# Patient Record
Sex: FEMALE | Race: WHITE | HISPANIC OR LATINO | Employment: OTHER | URBAN - METROPOLITAN AREA
[De-identification: names, ages, dates, MRNs, and addresses within clinical notes are randomized per-mention and may not be internally consistent; named-entity substitution may affect disease eponyms.]

---

## 2021-09-09 ENCOUNTER — LAB (OUTPATIENT)
Dept: LAB | Facility: CLINIC | Age: 21
End: 2021-09-09
Payer: COMMERCIAL

## 2021-09-09 DIAGNOSIS — H20.9 UVEITIS: Primary | ICD-10-CM

## 2021-09-09 LAB
BASOPHILS # BLD AUTO: 0 10E3/UL (ref 0–0.2)
BASOPHILS NFR BLD AUTO: 0 %
EOSINOPHIL # BLD AUTO: 0.1 10E3/UL (ref 0–0.7)
EOSINOPHIL NFR BLD AUTO: 2 %
ERYTHROCYTE [DISTWIDTH] IN BLOOD BY AUTOMATED COUNT: 12 % (ref 10–15)
HCT VFR BLD AUTO: 38.4 % (ref 35–47)
HGB BLD-MCNC: 12.7 G/DL (ref 11.7–15.7)
IMM GRANULOCYTES # BLD: 0 10E3/UL
IMM GRANULOCYTES NFR BLD: 0 %
LYMPHOCYTES # BLD AUTO: 2.4 10E3/UL (ref 0.8–5.3)
LYMPHOCYTES NFR BLD AUTO: 28 %
MCH RBC QN AUTO: 29.3 PG (ref 26.5–33)
MCHC RBC AUTO-ENTMCNC: 33.1 G/DL (ref 31.5–36.5)
MCV RBC AUTO: 89 FL (ref 78–100)
MONOCYTES # BLD AUTO: 0.4 10E3/UL (ref 0–1.3)
MONOCYTES NFR BLD AUTO: 4 %
NEUTROPHILS # BLD AUTO: 5.5 10E3/UL (ref 1.6–8.3)
NEUTROPHILS NFR BLD AUTO: 66 %
NRBC # BLD AUTO: 0 10E3/UL
NRBC BLD AUTO-RTO: 0 /100
PLATELET # BLD AUTO: 304 10E3/UL (ref 150–450)
RBC # BLD AUTO: 4.34 10E6/UL (ref 3.8–5.2)
RHEUMATOID FACT SER NEPH-ACNC: <15 IU/ML (ref 0–30)
WBC # BLD AUTO: 8.5 10E3/UL (ref 4–11)

## 2021-09-09 PROCEDURE — 81374 HLA I TYPING 1 ANTIGEN LR: CPT

## 2021-09-09 PROCEDURE — 86038 ANTINUCLEAR ANTIBODIES: CPT

## 2021-09-09 PROCEDURE — 86780 TREPONEMA PALLIDUM: CPT

## 2021-09-09 PROCEDURE — 85025 COMPLETE CBC W/AUTO DIFF WBC: CPT

## 2021-09-09 PROCEDURE — 36415 COLL VENOUS BLD VENIPUNCTURE: CPT

## 2021-09-09 PROCEDURE — 82164 ANGIOTENSIN I ENZYME TEST: CPT

## 2021-09-09 PROCEDURE — 86431 RHEUMATOID FACTOR QUANT: CPT

## 2021-09-09 PROCEDURE — 86618 LYME DISEASE ANTIBODY: CPT

## 2021-09-10 LAB
B BURGDOR IGG+IGM SER QL: 0.84
T PALLIDUM AB SER QL: NEGATIVE

## 2021-09-11 LAB — ACE SERPL-CCNC: 35 U/L

## 2021-09-14 LAB
ANA PAT SER IF-IMP: ABNORMAL
ANA SER QL IF: ABNORMAL
ANA TITR SER IF: ABNORMAL {TITER}

## 2021-09-16 LAB
B LOCUS: NORMAL
B27TEST METHOD: NORMAL

## 2022-06-19 ENCOUNTER — HOSPITAL ENCOUNTER (EMERGENCY)
Facility: CLINIC | Age: 22
Discharge: HOME | End: 2022-06-20
Attending: EMERGENCY MEDICINE | Admitting: EMERGENCY MEDICINE
Payer: COMMERCIAL

## 2022-06-19 ENCOUNTER — HOSPITAL ENCOUNTER (EMERGENCY)
Facility: CLINIC | Age: 22
Discharge: ANOTHER HOSPITAL | End: 2022-06-19
Attending: INTERNAL MEDICINE | Admitting: INTERNAL MEDICINE
Payer: COMMERCIAL

## 2022-06-19 VITALS
DIASTOLIC BLOOD PRESSURE: 94 MMHG | HEART RATE: 72 BPM | OXYGEN SATURATION: 91 % | SYSTOLIC BLOOD PRESSURE: 120 MMHG | RESPIRATION RATE: 16 BRPM | TEMPERATURE: 98.5 F | WEIGHT: 129.85 LBS
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
## 2022-06-19 DIAGNOSIS — S09.93XA FACIAL INJURY, INITIAL ENCOUNTER: ICD-10-CM

## 2022-06-19 DIAGNOSIS — S01.85XA DOG BITE OF FACE, INITIAL ENCOUNTER: ICD-10-CM

## 2022-06-19 DIAGNOSIS — S01.81XA FACIAL LACERATION, INITIAL ENCOUNTER: ICD-10-CM

## 2022-06-19 PROCEDURE — 96376 TX/PRO/DX INJ SAME DRUG ADON: CPT

## 2022-06-19 PROCEDURE — 250N000011 HC RX IP 250 OP 636: Performed by: EMERGENCY MEDICINE

## 2022-06-19 PROCEDURE — 90471 IMMUNIZATION ADMIN: CPT | Performed by: INTERNAL MEDICINE

## 2022-06-19 PROCEDURE — 99285 EMERGENCY DEPT VISIT HI MDM: CPT | Mod: 25

## 2022-06-19 PROCEDURE — 250N000011 HC RX IP 250 OP 636: Performed by: INTERNAL MEDICINE

## 2022-06-19 PROCEDURE — 99284 EMERGENCY DEPT VISIT MOD MDM: CPT | Performed by: EMERGENCY MEDICINE

## 2022-06-19 PROCEDURE — 96375 TX/PRO/DX INJ NEW DRUG ADDON: CPT

## 2022-06-19 PROCEDURE — 96374 THER/PROPH/DIAG INJ IV PUSH: CPT | Performed by: EMERGENCY MEDICINE

## 2022-06-19 PROCEDURE — 96365 THER/PROPH/DIAG IV INF INIT: CPT

## 2022-06-19 PROCEDURE — 99285 EMERGENCY DEPT VISIT HI MDM: CPT | Mod: 25 | Performed by: EMERGENCY MEDICINE

## 2022-06-19 PROCEDURE — 90715 TDAP VACCINE 7 YRS/> IM: CPT | Performed by: INTERNAL MEDICINE

## 2022-06-19 RX ORDER — MORPHINE SULFATE 4 MG/ML
4 INJECTION, SOLUTION INTRAMUSCULAR; INTRAVENOUS ONCE
Status: COMPLETED | OUTPATIENT
Start: 2022-06-19 | End: 2022-06-19

## 2022-06-19 RX ORDER — LIDOCAINE HYDROCHLORIDE AND EPINEPHRINE 10; 10 MG/ML; UG/ML
INJECTION, SOLUTION INFILTRATION; PERINEURAL
Status: DISCONTINUED
Start: 2022-06-19 | End: 2022-06-20 | Stop reason: HOSPADM

## 2022-06-19 RX ORDER — AMPICILLIN AND SULBACTAM 2; 1 G/1; G/1
3 INJECTION, POWDER, FOR SOLUTION INTRAMUSCULAR; INTRAVENOUS ONCE
Status: COMPLETED | OUTPATIENT
Start: 2022-06-19 | End: 2022-06-19

## 2022-06-19 RX ORDER — LIDOCAINE HYDROCHLORIDE 10 MG/ML
INJECTION, SOLUTION EPIDURAL; INFILTRATION; INTRACAUDAL; PERINEURAL
Status: DISCONTINUED
Start: 2022-06-19 | End: 2022-06-20 | Stop reason: HOSPADM

## 2022-06-19 RX ORDER — HYDROMORPHONE HYDROCHLORIDE 1 MG/ML
0.5 INJECTION, SOLUTION INTRAMUSCULAR; INTRAVENOUS; SUBCUTANEOUS
Status: DISCONTINUED | OUTPATIENT
Start: 2022-06-19 | End: 2022-06-20 | Stop reason: HOSPADM

## 2022-06-19 RX ORDER — LIDOCAINE HYDROCHLORIDE AND EPINEPHRINE 10; 10 MG/ML; UG/ML
20 INJECTION, SOLUTION INFILTRATION; PERINEURAL ONCE
Status: DISCONTINUED | OUTPATIENT
Start: 2022-06-19 | End: 2022-06-19 | Stop reason: HOSPADM

## 2022-06-19 RX ORDER — HYDROMORPHONE HYDROCHLORIDE 1 MG/ML
0.5 INJECTION, SOLUTION INTRAMUSCULAR; INTRAVENOUS; SUBCUTANEOUS
Status: DISCONTINUED | OUTPATIENT
Start: 2022-06-19 | End: 2022-06-19 | Stop reason: HOSPADM

## 2022-06-19 RX ADMIN — AMPICILLIN SODIUM AND SULBACTAM SODIUM 3 G: 2; 1 INJECTION, POWDER, FOR SOLUTION INTRAMUSCULAR; INTRAVENOUS at 20:25

## 2022-06-19 RX ADMIN — CLOSTRIDIUM TETANI TOXOID ANTIGEN (FORMALDEHYDE INACTIVATED), CORYNEBACTERIUM DIPHTHERIAE TOXOID ANTIGEN (FORMALDEHYDE INACTIVATED), BORDETELLA PERTUSSIS TOXOID ANTIGEN (GLUTARALDEHYDE INACTIVATED), BORDETELLA PERTUSSIS FILAMENTOUS HEMAGGLUTININ ANTIGEN (FORMALDEHYDE INACTIVATED), BORDETELLA PERTUSSIS PERTACTIN ANTIGEN, AND BORDETELLA PERTUSSIS FIMBRIAE 2/3 ANTIGEN 0.5 ML: 5; 2; 2.5; 5; 3; 5 INJECTION, SUSPENSION INTRAMUSCULAR at 20:08

## 2022-06-19 RX ADMIN — MORPHINE SULFATE 4 MG: 4 INJECTION INTRAVENOUS at 20:00

## 2022-06-19 RX ADMIN — HYDROMORPHONE HYDROCHLORIDE 0.5 MG: 1 INJECTION, SOLUTION INTRAMUSCULAR; INTRAVENOUS; SUBCUTANEOUS at 21:04

## 2022-06-19 RX ADMIN — HYDROMORPHONE HYDROCHLORIDE 0.5 MG: 1 INJECTION, SOLUTION INTRAMUSCULAR; INTRAVENOUS; SUBCUTANEOUS at 20:23

## 2022-06-19 RX ADMIN — HYDROMORPHONE HYDROCHLORIDE 0.5 MG: 1 INJECTION, SOLUTION INTRAMUSCULAR; INTRAVENOUS; SUBCUTANEOUS at 22:49

## 2022-06-19 ASSESSMENT — ENCOUNTER SYMPTOMS: WOUND: 1

## 2022-06-20 VITALS
RESPIRATION RATE: 16 BRPM | OXYGEN SATURATION: 98 % | DIASTOLIC BLOOD PRESSURE: 81 MMHG | SYSTOLIC BLOOD PRESSURE: 119 MMHG

## 2022-06-20 RX ORDER — LIDOCAINE HYDROCHLORIDE AND EPINEPHRINE 10; 10 MG/ML; UG/ML
INJECTION, SOLUTION INFILTRATION; PERINEURAL
Status: DISCONTINUED
Start: 2022-06-20 | End: 2022-06-20 | Stop reason: HOSPADM

## 2022-06-20 RX ORDER — OXYCODONE HYDROCHLORIDE 5 MG/1
5 TABLET ORAL EVERY 6 HOURS PRN
Qty: 15 TABLET | Refills: 0 | Status: SHIPPED | OUTPATIENT
Start: 2022-06-20 | End: 2022-06-24

## 2022-06-20 RX ORDER — CHLORHEXIDINE GLUCONATE ORAL RINSE 1.2 MG/ML
15 SOLUTION DENTAL 2 TIMES DAILY
Qty: 473 ML | Refills: 0 | Status: SHIPPED | OUTPATIENT
Start: 2022-06-20 | End: 2023-03-09

## 2022-06-20 RX ORDER — MINERAL OIL/HYDROPHIL PETROLAT
OINTMENT (GRAM) TOPICAL 2 TIMES DAILY
Qty: 198 G | Refills: 0 | Status: SHIPPED | OUTPATIENT
Start: 2022-06-20 | End: 2023-03-09

## 2022-06-20 RX ORDER — ERYTHROMYCIN 20 MG/G
GEL TOPICAL DAILY
Qty: 60 G | Refills: 0 | Status: ON HOLD | OUTPATIENT
Start: 2022-06-20 | End: 2022-06-22

## 2022-06-20 NOTE — CONSULTS
ENT CONSULTATION    Reason: Facial trauma    HPI: Ms. Felisa Miramontes is a 22F who unfortunately was bitten by a dog earlier this evening. She presented initially to the [facility] Emergency Dept and was transferred to [facility] ED for definitive facial trauma management. In the [facility] ED she was given unasyn, Tdap, and pain medication.    PMH - none  PSH - none  ALL - nkda  SOC - not assessed  FAM - accompanied by  on exam  ROS - see above    OBJECTIVE  BP (!) 120/94   Pulse 72   Temp 98.5  F (36.9  C)   Resp 16   Wt 58.9 kg (129 lb 13.6 oz)   SpO2 91%    GENERAL - young adult female, significant facial trauma  FACE - 7 cm laceration of left chin, 9 cm u-shaped laceration of right oral commissure area. 4 cm laceration of right infraorbital area. 5 cm laceration of right brow. 1 cm laceration right nasal dorsum.  Possible left marginal mandibular nerve weakness.  CNV1-3 intact bl  EARS - wnl  ORAL CAV - 6 cm laceration of the mandibular gingivobuccal sulcus, some degloving. Right mental nerve appears exposed.  OROPHARYNX - normal  NECK - soft, no lacerations  NOSE - 1 cm laceration right nasal dorsum  EYE - EOMI, PERRL  PULM - breathing comfortably on RA  NEURO - awake, alert, oriented    PROCEDURE - laceration closure  The patient provided verbal consent for the procedure. 20 mL of 1% lidocaine with 1:100,000 was infiltrated in wounds for local anesthesia. The wounds were then thoroughly irrigated with 1L of sterile saline. The area was then prepped and draped in sterile fashion. 4-0 vicryl was used to reapproximate the gingivobuccal sulcus, followed by 4-0 chromic. 4-0 vicryl was used for deep sutures on the face. 5-0 nylon was used for skin of the left chin. The remaining suture was 6-0 nylon. There was a total of 32 cm of complex laceration closure. This took over 2 hours to complete. The patient overall tolerated this well.    ASSESSMENT  Ms. Abbey Miramontes had dogbite lacerations closed at bedside in the ED  this evening.    PLAN  - 7 days total augmentin  - erythromycin ointment BID x2 days, followed by aquaphor ointment BID to lacerations  - soft diet given intraoral involvement  - peridex  - message sent to clinic for suture removal in 1 week    John Hill MD    To be d/w Dr. Cui    I, Chen Cui MD, discussed this patient with the resident/fellow at the time of consultation. I have reviewed the note and images and am in agreement with the assessment and plan. I did not see the patient.  Chen Cui MD

## 2022-06-20 NOTE — ED TRIAGE NOTES
Dog bite to the face - Cane Josesito - Injury to jaw/face.  4mg Zofran via EMS.  C/o increased pain en route.  TDap given at previous facility.  VSS on RA

## 2022-06-20 NOTE — ED PROVIDER NOTES
ED Provider Note  [facility]      History     Chief Complaint   Patient presents with     Dog Bite     HPI  Felisa Miramontes is an otherwise healthy 22 year old female who presents as a transfer from [facility] ED for further evaluation of a dog bite to the face. 4 mg Zofran given en route.     Patient presented to [facility] with significant facial trauma sustained from a dog bite just prior to arrival. Dog belonged to them, was healthy and fully immunized. Concern for possible facial fractures so patient was transferred to [facility] for further care by facial trauma specialists.     Dog bite occurred approximately 30 minutes prior to presentation at [facility].  The dog was a cane Josesito.  It was her friend's dog.  The dog is up-to-date on vaccinations.  She suffered bites to her right thigh, chin, mouth, and left face.  She has severe pain to this area.  She states she has difficulty swallowing due to the pain.  Having difficulty opening her mouth due to the pain.  Pt sent to ED for definitive repair.  Case discussed with ENT/facial trauma who plan to repair the wound in the ED.    Interventions @ [facility]:  2000    Morphine 4 mg IV  2008    Tdap 0.5 mg IM   2023    Dilaudid 0.5 mg IV  2025    Unasyn 3 g IV  2104    Dilaudid 0.5 mg IV      Past Medical History  No past medical history on file.  No past surgical history on file.  No current outpatient medications on file.    No Known Allergies  Family History  No family history on file.  Social History       Past medical history, past surgical history, medications, allergies, family history, and social history were reviewed with the patient. No additional pertinent items.       Review of Systems  A complete review of systems was performed with pertinent positives and negatives noted in the HPI, and all other systems negative.    Physical Exam      Physical Exam  Vitals and nursing note reviewed.   Constitutional:       General: She is not  in acute distress.     Appearance: Normal appearance.   HENT:      Head: Normocephalic.        Nose: Nose normal.      Mouth/Throat:     Eyes:      Pupils: Pupils are equal, round, and reactive to light.   Cardiovascular:      Rate and Rhythm: Normal rate and regular rhythm.   Pulmonary:      Effort: Pulmonary effort is normal.   Abdominal:      General: There is no distension.   Musculoskeletal:         General: No deformity. Normal range of motion.      Cervical back: Normal range of motion.   Skin:     General: Skin is warm.   Neurological:      Mental Status: She is alert and oriented to person, place, and time.   Psychiatric:         Mood and Affect: Mood normal.       ED Course   Patient was seen in outside ED for evaluation of multiple dog bite wounds.  Was sent to our ED for facial trauma consult.  On arrival to our ED, patient multiple lacerations to her face, see physical exam.  She has normal vital signs.  She is in no acute distress.  Airway intact.  Bleeding controlled. Tetanus was updated at outside facility and she was given IV Unasyn and pain medication    Patient was evaluated by the facial trauma surgery service.  See their consult note for details on procedural repair of multiple dog bite wounds.  They recommend Aquaphor daily to the wounds, Peridex rinse, and Augmentin.  I prescribed pain medication as well.  Patient is to follow-up in the ENT office in 1 week.  Educated signs of infection for which to monitor and reasons to return to the ED.           No results found for any visits on 06/19/22.  Medications   lidocaine (PF) (XYLOCAINE) 1 % injection (has no administration in time range)        Assessments & Plan (with Medical Decision Making)       I have reviewed the nursing notes. I have reviewed the findings, diagnosis, plan and need for follow up with the patient.    New Prescriptions    No medications on file       Final diagnoses:   None       --  Sid Villanueva DO  [facility]  [facility] EMERGENCY DEPARTMENT  6/19/2022     Sid Villanueva,   06/24/22 [id number]

## 2022-06-20 NOTE — ED TRIAGE NOTES
Pt bit by friends dog approx 30 min ago.  Multiple large wounds to face. Pt unable to open Rt eye.  Pt refuses to speak d/t large amount of blood present in mouth.

## 2022-06-20 NOTE — ED PROVIDER NOTES
History   Chief Complaint:  Dog Bite       The history is limited by the condition of the patient.      Felisa Miramontes is an otherwise healthy 22 year old female who presents with a dog bite. A short time ago the patient was bitten in the face by a friend's dog.  reports it's a very large dog. He notes the dog is healthy and fully immunized. The patient reports that her chin hurts the worst with difficulty opening her mouth.  At first the patient was unable to open the right eye, but after dried blood was removed she now says her vision in the right eye is good.  Patient denies any injury except to the face.     Review of Systems   Unable to perform ROS: Acuity of condition   Eyes: Negative for visual disturbance.   Skin: Positive for wound (dog bite (face)).        Allergies:  The patient has no known allergies.     Medications:  Nifedipine    Past Medical History:     Pre-eclampsia, third trimester  Elevated blood pressure complicating pregnancy, third trimester  GDM, third trimester     Past Surgical History:    Appendectomy    Social History:  The patient presents to the ED with her .  The patient presents to the ED via car.    Physical Exam     Patient Vitals for the past 24 hrs:   BP Temp Pulse Resp SpO2 Weight   06/19/22 2101 (!) 120/94 -- 72 -- 91 % --   06/19/22 2100 (!) 120/94 -- 72 -- 99 % --   06/19/22 2045 (!) 139/99 -- 74 -- 98 % --   06/19/22 2030 134/89 -- 76 -- 96 % --   06/19/22 2015 (!) 130/95 -- 77 -- 99 % --   06/19/22 2000 (!) 138/93 -- -- -- 100 % --   06/19/22 1937 (!) 138/109 98.5  F (36.9  C) 89 16 99 % 58.9 kg (129 lb 13.6 oz)       Physical Exam  Constitutional:       Comments: Tearful   HENT:      Head:      Comments: Significant facial trauma as documented in photographs.   Patient is unable to open the mouth at all due to pain.  Eyes:      Conjunctiva/sclera:      Right eye: Right conjunctiva is injected.      Left eye: Left conjunctiva is injected.   Cardiovascular:       Rate and Rhythm: Regular rhythm.      Heart sounds: Normal heart sounds.   Pulmonary:      Effort: Pulmonary effort is normal.      Breath sounds: Normal breath sounds.   Abdominal:      General: Bowel sounds are normal.      Palpations: Abdomen is soft.      Tenderness: There is no guarding or rebound.   Musculoskeletal:         General: Normal range of motion.      Cervical back: Normal range of motion.   Lymphadenopathy:      Cervical: No cervical adenopathy.   Skin:     General: Skin is warm and dry.   Neurological:      Mental Status: She is alert.   Psychiatric:         Behavior: Behavior is cooperative.           Emergency Department Course     Emergency Department Course:       Reviewed:  I reviewed nursing notes, vitals, past medical history and Care Everywhere    Assessments:  1959 I obtained history and examined the patient as noted above.   2050 I rechecked the patient and explained findings.   2055 I rechecked the patient and explained findings.       Consults:  2016 I spoke with Dr. Villanueva from emergency medicine at [facility] regarding the patient's presentation and plan of care.  2027 I spoke with Dr. Cui from ENT at [facility] regarding the patient's presentation and plan of care.  2106 I spoke with Dr. Villanueva from emergency medicine at [facility] regarding the patient's presentation and plan of care.    Interventions:  2000 Morphine 4 mg IV  2008 Tdap 0.5 mg IM   2023 Dilaudid 0.5 mg IV  2025 Unasyn 3 g IV  2104 Dilaudid 0.5 mg IV      Disposition:  The patient was transferred to [facility] via ambulance. Dr. Villanueva accepted the patient for transfer.     Impression & Plan   Medical Decision Making:    Felisa Miramontes is a 22 year old female who presents to the emergency department with significant facial trauma sustained from a dog bite.  We have treated aggressively with IV Unasyn.  Tetanus shot is updated and she is given medication for pain.  Her inability to open at all makes me concerned about the possibility of  facial fracture.  She will clearly require care by facial trauma specialist.  As noted I spoke with our associates at the [facility] who has kindly agreed to accept the patient in transfer.  She will be transferred via [facility] ambulance.    Diagnosis:    ICD-10-CM    1. Facial injury, initial encounter  S09.93XA        Discharge Medications:  New Prescriptions    No medications on file       Scribe Disclosure:  I, Goyoyehuda Mann, am serving as a scribe at 7:59 PM on 6/19/2022 to document services personally performed by Ernestina Panda MD based on my observations and the provider's statements to me.         Ernestina Panda MD  06/19/22 8303

## 2022-06-20 NOTE — DISCHARGE INSTRUCTIONS
Please make an appointment to follow up with [facility] (phone: [phone]) in 7 days.

## 2022-06-21 ENCOUNTER — HOSPITAL ENCOUNTER (OUTPATIENT)
Facility: CLINIC | Age: 22
Setting detail: OBSERVATION
Discharge: HOME | End: 2022-06-22
Attending: EMERGENCY MEDICINE | Admitting: NURSE PRACTITIONER
Payer: COMMERCIAL

## 2022-06-21 ENCOUNTER — TELEPHONE (OUTPATIENT)
Dept: OTOLARYNGOLOGY | Facility: CLINIC | Age: 22
End: 2022-06-21
Payer: COMMERCIAL

## 2022-06-21 DIAGNOSIS — S01.85XA OPEN BITE OF OTHER PART OF HEAD, INITIAL ENCOUNTER: ICD-10-CM

## 2022-06-21 DIAGNOSIS — L08.9 SKIN INFECTION: ICD-10-CM

## 2022-06-21 DIAGNOSIS — L08.9 INFECTED DOG BITE OF FACE, INITIAL ENCOUNTER: ICD-10-CM

## 2022-06-21 DIAGNOSIS — S01.451A: ICD-10-CM

## 2022-06-21 DIAGNOSIS — S01.85XA INFECTED DOG BITE OF FACE, INITIAL ENCOUNTER: ICD-10-CM

## 2022-06-21 DIAGNOSIS — S01.25XA OPEN BITE OF NOSE, INITIAL ENCOUNTER: ICD-10-CM
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
## 2022-06-21 DIAGNOSIS — S01.151A OPEN BITE OF RIGHT EYELID AND PERIOCULAR AREA, INITIAL ENCOUNTER: ICD-10-CM
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
## 2022-06-21 DIAGNOSIS — L03.211 FACIAL CELLULITIS: Primary | ICD-10-CM

## 2022-06-21 DIAGNOSIS — Z20.822 CONTACT WITH AND (SUSPECTED) EXPOSURE TO COVID-19: ICD-10-CM

## 2022-06-21 LAB
ANION GAP SERPL CALCULATED.3IONS-SCNC: 9 MMOL/L (ref 7–15)
BASOPHILS # BLD AUTO: 0 10E3/UL (ref 0–0.2)
BASOPHILS NFR BLD AUTO: 0 %
BUN SERPL-MCNC: 11.5 MG/DL (ref 6–20)
CALCIUM SERPL-MCNC: 8.9 MG/DL (ref 8.6–10)
CHLORIDE SERPL-SCNC: 99 MMOL/L (ref 98–107)
CREAT SERPL-MCNC: 0.66 MG/DL (ref 0.51–0.95)
CRP SERPL-MCNC: 28.1 MG/L
DEPRECATED HCO3 PLAS-SCNC: 27 MMOL/L (ref 22–29)
EOSINOPHIL # BLD AUTO: 0.1 10E3/UL (ref 0–0.7)
EOSINOPHIL NFR BLD AUTO: 2 %
ERYTHROCYTE [DISTWIDTH] IN BLOOD BY AUTOMATED COUNT: 11.8 % (ref 10–15)
ERYTHROCYTE [SEDIMENTATION RATE] IN BLOOD BY WESTERGREN METHOD: 33 MM/HR (ref 0–20)
GFR SERPL CREATININE-BSD FRML MDRD: >90 ML/MIN/1.73M2
GLUCOSE SERPL-MCNC: 87 MG/DL (ref 70–99)
HCT VFR BLD AUTO: 38 % (ref 35–47)
HGB BLD-MCNC: 12.5 G/DL (ref 11.7–15.7)
IMM GRANULOCYTES # BLD: 0 10E3/UL
IMM GRANULOCYTES NFR BLD: 0 %
LYMPHOCYTES # BLD AUTO: 2.4 10E3/UL (ref 0.8–5.3)
LYMPHOCYTES NFR BLD AUTO: 32 %
MCH RBC QN AUTO: 29.2 PG (ref 26.5–33)
MCHC RBC AUTO-ENTMCNC: 32.9 G/DL (ref 31.5–36.5)
MCV RBC AUTO: 89 FL (ref 78–100)
MONOCYTES # BLD AUTO: 0.6 10E3/UL (ref 0–1.3)
MONOCYTES NFR BLD AUTO: 8 %
NEUTROPHILS # BLD AUTO: 4.4 10E3/UL (ref 1.6–8.3)
NEUTROPHILS NFR BLD AUTO: 58 %
NRBC # BLD AUTO: 0 10E3/UL
NRBC BLD AUTO-RTO: 0 /100
PLATELET # BLD AUTO: 279 10E3/UL (ref 150–450)
POTASSIUM SERPL-SCNC: 4.1 MMOL/L (ref 3.4–4.5)
RBC # BLD AUTO: 4.28 10E6/UL (ref 3.8–5.2)
SARS-COV-2 RNA RESP QL NAA+PROBE: NEGATIVE
SODIUM SERPL-SCNC: 135 MMOL/L (ref 136–145)
WBC # BLD AUTO: 7.5 10E3/UL (ref 4–11)

## 2022-06-21 PROCEDURE — 36415 COLL VENOUS BLD VENIPUNCTURE: CPT | Performed by: NURSE PRACTITIONER

## 2022-06-21 PROCEDURE — 99218 PR INITIAL OBSERVATION CARE,LEVEL I: CPT | Performed by: NURSE PRACTITIONER

## 2022-06-21 PROCEDURE — 250N000013 HC RX MED GY IP 250 OP 250 PS 637: Performed by: EMERGENCY MEDICINE

## 2022-06-21 PROCEDURE — 85652 RBC SED RATE AUTOMATED: CPT | Performed by: EMERGENCY MEDICINE

## 2022-06-21 PROCEDURE — 250N000011 HC RX IP 250 OP 636: Performed by: EMERGENCY MEDICINE

## 2022-06-21 PROCEDURE — 258N000003 HC RX IP 258 OP 636: Performed by: EMERGENCY MEDICINE

## 2022-06-21 PROCEDURE — 99285 EMERGENCY DEPT VISIT HI MDM: CPT | Performed by: EMERGENCY MEDICINE

## 2022-06-21 PROCEDURE — 85025 COMPLETE CBC W/AUTO DIFF WBC: CPT | Performed by: EMERGENCY MEDICINE

## 2022-06-21 PROCEDURE — 99285 EMERGENCY DEPT VISIT HI MDM: CPT | Mod: 25 | Performed by: EMERGENCY MEDICINE

## 2022-06-21 PROCEDURE — 96375 TX/PRO/DX INJ NEW DRUG ADDON: CPT | Performed by: EMERGENCY MEDICINE

## 2022-06-21 PROCEDURE — G0378 HOSPITAL OBSERVATION PER HR: HCPCS

## 2022-06-21 PROCEDURE — 86140 C-REACTIVE PROTEIN: CPT | Performed by: EMERGENCY MEDICINE

## 2022-06-21 PROCEDURE — 80048 BASIC METABOLIC PNL TOTAL CA: CPT | Performed by: EMERGENCY MEDICINE

## 2022-06-21 PROCEDURE — U0003 INFECTIOUS AGENT DETECTION BY NUCLEIC ACID (DNA OR RNA); SEVERE ACUTE RESPIRATORY SYNDROME CORONAVIRUS 2 (SARS-COV-2) (CORONAVIRUS DISEASE [COVID-19]), AMPLIFIED PROBE TECHNIQUE, MAKING USE OF HIGH THROUGHPUT TECHNOLOGIES AS DESCRIBED BY CMS-2020-01-R: HCPCS | Performed by: EMERGENCY MEDICINE

## 2022-06-21 PROCEDURE — 87040 BLOOD CULTURE FOR BACTERIA: CPT | Performed by: NURSE PRACTITIONER

## 2022-06-21 PROCEDURE — 99235 HOSP IP/OBS SAME DATE MOD 70: CPT | Performed by: OTOLARYNGOLOGY

## 2022-06-21 PROCEDURE — 96365 THER/PROPH/DIAG IV INF INIT: CPT | Performed by: EMERGENCY MEDICINE

## 2022-06-21 PROCEDURE — 36415 COLL VENOUS BLD VENIPUNCTURE: CPT | Performed by: EMERGENCY MEDICINE

## 2022-06-21 PROCEDURE — C9803 HOPD COVID-19 SPEC COLLECT: HCPCS | Performed by: EMERGENCY MEDICINE

## 2022-06-21 RX ORDER — CHLORHEXIDINE GLUCONATE ORAL RINSE 1.2 MG/ML
15 SOLUTION DENTAL 3 TIMES DAILY
Status: DISCONTINUED | OUTPATIENT
Start: 2022-06-21 | End: 2022-06-22 | Stop reason: HOSPADM

## 2022-06-21 RX ORDER — CHLORHEXIDINE GLUCONATE ORAL RINSE 1.2 MG/ML
15 SOLUTION DENTAL 3 TIMES DAILY
Status: DISCONTINUED | OUTPATIENT
Start: 2022-06-22 | End: 2022-06-21

## 2022-06-21 RX ORDER — CEFTRIAXONE 2 G/1
2 INJECTION, POWDER, FOR SOLUTION INTRAMUSCULAR; INTRAVENOUS ONCE
Status: COMPLETED | OUTPATIENT
Start: 2022-06-21 | End: 2022-06-21

## 2022-06-21 RX ORDER — METRONIDAZOLE 500 MG/100ML
500 INJECTION, SOLUTION INTRAVENOUS ONCE
Status: COMPLETED | OUTPATIENT
Start: 2022-06-21 | End: 2022-06-22

## 2022-06-21 RX ORDER — SODIUM CHLORIDE 9 MG/ML
INJECTION, SOLUTION INTRAVENOUS CONTINUOUS
Status: DISCONTINUED | OUTPATIENT
Start: 2022-06-21 | End: 2022-06-22

## 2022-06-21 RX ORDER — KETOROLAC TROMETHAMINE 15 MG/ML
15 INJECTION, SOLUTION INTRAMUSCULAR; INTRAVENOUS ONCE
Status: COMPLETED | OUTPATIENT
Start: 2022-06-21 | End: 2022-06-21

## 2022-06-21 RX ADMIN — CHLORHEXIDINE GLUCONATE 0.12% ORAL RINSE 15 ML: 1.2 LIQUID ORAL at 23:48

## 2022-06-21 RX ADMIN — KETOROLAC TROMETHAMINE 15 MG: 15 INJECTION, SOLUTION INTRAMUSCULAR; INTRAVENOUS at 21:36

## 2022-06-21 RX ADMIN — CEFTRIAXONE SODIUM 2 G: 2 INJECTION, POWDER, FOR SOLUTION INTRAMUSCULAR; INTRAVENOUS at 22:16

## 2022-06-21 RX ADMIN — SODIUM CHLORIDE 1000 ML: 9 INJECTION, SOLUTION INTRAVENOUS at 22:16

## 2022-06-21 ASSESSMENT — ENCOUNTER SYMPTOMS
COLOR CHANGE: 0
FACIAL SWELLING: 1
VOMITING: 0
BRUISES/BLEEDS EASILY: 0
CONFUSION: 0
WOUND: 1
SHORTNESS OF BREATH: 0
EYE REDNESS: 0
HEADACHES: 0
ABDOMINAL PAIN: 0
ARTHRALGIAS: 0
TROUBLE SWALLOWING: 0
NECK STIFFNESS: 0
FEVER: 0
NAUSEA: 0
DIFFICULTY URINATING: 0

## 2022-06-22 VITALS
HEART RATE: 54 BPM | OXYGEN SATURATION: 100 % | TEMPERATURE: 98.4 F | SYSTOLIC BLOOD PRESSURE: 103 MMHG | RESPIRATION RATE: 18 BRPM | DIASTOLIC BLOOD PRESSURE: 61 MMHG | BODY MASS INDEX: 27.08 KG/M2 | WEIGHT: 129 LBS | HEIGHT: 58 IN

## 2022-06-22 PROBLEM — L08.9 SKIN INFECTION: Status: ACTIVE | Noted: 2022-06-22

## 2022-06-22 LAB
ANION GAP SERPL CALCULATED.3IONS-SCNC: 2 MMOL/L (ref 7–15)
BASOPHILS # BLD AUTO: 0 10E3/UL (ref 0–0.2)
BASOPHILS NFR BLD AUTO: 0 %
BUN SERPL-MCNC: 7 MG/DL (ref 6–20)
CALCIUM SERPL-MCNC: 8.6 MG/DL (ref 8.6–10)
CHLORIDE SERPL-SCNC: 115 MMOL/L (ref 98–107)
CREAT SERPL-MCNC: 0.53 MG/DL (ref 0.51–0.95)
CRP SERPL-MCNC: 17.3 MG/L
DEPRECATED HCO3 PLAS-SCNC: 31 MMOL/L (ref 22–29)
EOSINOPHIL # BLD AUTO: 0.2 10E3/UL (ref 0–0.7)
EOSINOPHIL NFR BLD AUTO: 2 %
ERYTHROCYTE [DISTWIDTH] IN BLOOD BY AUTOMATED COUNT: 11.9 % (ref 10–15)
GFR SERPL CREATININE-BSD FRML MDRD: >90 ML/MIN/1.73M2
GLUCOSE SERPL-MCNC: 83 MG/DL (ref 70–99)
HCT VFR BLD AUTO: 31.7 % (ref 35–47)
HGB BLD-MCNC: 10.3 G/DL (ref 11.7–15.7)
IMM GRANULOCYTES # BLD: 0 10E3/UL
IMM GRANULOCYTES NFR BLD: 0 %
LYMPHOCYTES # BLD AUTO: 2.6 10E3/UL (ref 0.8–5.3)
LYMPHOCYTES NFR BLD AUTO: 34 %
MCH RBC QN AUTO: 29.2 PG (ref 26.5–33)
MCHC RBC AUTO-ENTMCNC: 32.5 G/DL (ref 31.5–36.5)
MCV RBC AUTO: 90 FL (ref 78–100)
MONOCYTES # BLD AUTO: 0.7 10E3/UL (ref 0–1.3)
MONOCYTES NFR BLD AUTO: 9 %
MRSA DNA SPEC QL NAA+PROBE: NEGATIVE
NEUTROPHILS # BLD AUTO: 4 10E3/UL (ref 1.6–8.3)
NEUTROPHILS NFR BLD AUTO: 55 %
NRBC # BLD AUTO: 0 10E3/UL
NRBC BLD AUTO-RTO: 0 /100
PLATELET # BLD AUTO: 222 10E3/UL (ref 150–450)
POTASSIUM SERPL-SCNC: 4.5 MMOL/L (ref 3.4–4.5)
RBC # BLD AUTO: 3.53 10E6/UL (ref 3.8–5.2)
SA TARGET DNA: NEGATIVE
SODIUM SERPL-SCNC: 148 MMOL/L (ref 136–145)
WBC # BLD AUTO: 7.5 10E3/UL (ref 4–11)

## 2022-06-22 PROCEDURE — 36415 COLL VENOUS BLD VENIPUNCTURE: CPT | Performed by: NURSE PRACTITIONER

## 2022-06-22 PROCEDURE — 87641 MR-STAPH DNA AMP PROBE: CPT | Performed by: NURSE PRACTITIONER

## 2022-06-22 PROCEDURE — G0378 HOSPITAL OBSERVATION PER HR: HCPCS

## 2022-06-22 PROCEDURE — 87040 BLOOD CULTURE FOR BACTERIA: CPT | Performed by: NURSE PRACTITIONER

## 2022-06-22 PROCEDURE — 99204 OFFICE O/P NEW MOD 45 MIN: CPT | Performed by: INTERNAL MEDICINE

## 2022-06-22 PROCEDURE — 250N000011 HC RX IP 250 OP 636: Performed by: EMERGENCY MEDICINE

## 2022-06-22 PROCEDURE — 250N000009 HC RX 250: Performed by: NURSE PRACTITIONER

## 2022-06-22 PROCEDURE — 250N000013 HC RX MED GY IP 250 OP 250 PS 637: Performed by: NURSE PRACTITIONER

## 2022-06-22 PROCEDURE — 96376 TX/PRO/DX INJ SAME DRUG ADON: CPT

## 2022-06-22 PROCEDURE — 86140 C-REACTIVE PROTEIN: CPT | Performed by: NURSE PRACTITIONER

## 2022-06-22 PROCEDURE — 80048 BASIC METABOLIC PNL TOTAL CA: CPT | Performed by: NURSE PRACTITIONER

## 2022-06-22 PROCEDURE — 85025 COMPLETE CBC W/AUTO DIFF WBC: CPT | Performed by: NURSE PRACTITIONER

## 2022-06-22 PROCEDURE — 99217 PR OBSERVATION CARE DISCHARGE: CPT | Performed by: NURSE PRACTITIONER

## 2022-06-22 PROCEDURE — 250N000013 HC RX MED GY IP 250 OP 250 PS 637: Performed by: EMERGENCY MEDICINE

## 2022-06-22 PROCEDURE — 250N000011 HC RX IP 250 OP 636: Performed by: NURSE PRACTITIONER

## 2022-06-22 PROCEDURE — 96367 TX/PROPH/DG ADDL SEQ IV INF: CPT | Performed by: EMERGENCY MEDICINE

## 2022-06-22 PROCEDURE — 258N000003 HC RX IP 258 OP 636: Performed by: EMERGENCY MEDICINE

## 2022-06-22 RX ORDER — NALOXONE HYDROCHLORIDE 0.4 MG/ML
0.4 INJECTION, SOLUTION INTRAMUSCULAR; INTRAVENOUS; SUBCUTANEOUS
Status: DISCONTINUED | OUTPATIENT
Start: 2022-06-22 | End: 2022-06-22 | Stop reason: HOSPADM

## 2022-06-22 RX ORDER — MINERAL OIL/HYDROPHIL PETROLAT
OINTMENT (GRAM) TOPICAL 2 TIMES DAILY
Status: DISCONTINUED | OUTPATIENT
Start: 2022-06-22 | End: 2022-06-22 | Stop reason: HOSPADM

## 2022-06-22 RX ORDER — ONDANSETRON 4 MG/1
4 TABLET, ORALLY DISINTEGRATING ORAL EVERY 6 HOURS PRN
Status: DISCONTINUED | OUTPATIENT
Start: 2022-06-22 | End: 2022-06-22 | Stop reason: HOSPADM

## 2022-06-22 RX ORDER — METRONIDAZOLE 500 MG/1
500 TABLET ORAL 3 TIMES DAILY
Qty: 42 TABLET | Refills: 0 | Status: SHIPPED | OUTPATIENT
Start: 2022-06-22 | End: 2023-03-09

## 2022-06-22 RX ORDER — NALOXONE HYDROCHLORIDE 0.4 MG/ML
0.2 INJECTION, SOLUTION INTRAMUSCULAR; INTRAVENOUS; SUBCUTANEOUS
Status: DISCONTINUED | OUTPATIENT
Start: 2022-06-22 | End: 2022-06-22 | Stop reason: HOSPADM

## 2022-06-22 RX ORDER — METRONIDAZOLE 500 MG/1
500 TABLET ORAL 3 TIMES DAILY
Qty: 42 TABLET | Refills: 0 | Status: SHIPPED | OUTPATIENT
Start: 2022-06-22 | End: 2022-06-22

## 2022-06-22 RX ORDER — CEFUROXIME AXETIL 500 MG/1
500 TABLET ORAL 2 TIMES DAILY
Qty: 28 TABLET | Refills: 0 | Status: SHIPPED | OUTPATIENT
Start: 2022-06-22 | End: 2023-03-09

## 2022-06-22 RX ORDER — CEFUROXIME AXETIL 500 MG/1
500 TABLET ORAL 2 TIMES DAILY
Qty: 28 TABLET | Refills: 0 | Status: SHIPPED | OUTPATIENT
Start: 2022-06-22 | End: 2022-06-22

## 2022-06-22 RX ORDER — CEFAZOLIN SODIUM 2 G/100ML
2 INJECTION, SOLUTION INTRAVENOUS EVERY 24 HOURS
Status: DISCONTINUED | OUTPATIENT
Start: 2022-06-22 | End: 2022-06-22 | Stop reason: CLARIF

## 2022-06-22 RX ORDER — CEFTRIAXONE 2 G/1
2 INJECTION, POWDER, FOR SOLUTION INTRAMUSCULAR; INTRAVENOUS EVERY 24 HOURS
Status: DISCONTINUED | OUTPATIENT
Start: 2022-06-22 | End: 2022-06-22 | Stop reason: HOSPADM

## 2022-06-22 RX ORDER — KETOROLAC TROMETHAMINE 30 MG/ML
15 INJECTION, SOLUTION INTRAMUSCULAR; INTRAVENOUS EVERY 6 HOURS PRN
Status: DISCONTINUED | OUTPATIENT
Start: 2022-06-22 | End: 2022-06-22 | Stop reason: HOSPADM

## 2022-06-22 RX ORDER — HYDROMORPHONE HCL IN WATER/PF 6 MG/30 ML
0.2 PATIENT CONTROLLED ANALGESIA SYRINGE INTRAVENOUS
Status: DISCONTINUED | OUTPATIENT
Start: 2022-06-22 | End: 2022-06-22 | Stop reason: HOSPADM

## 2022-06-22 RX ORDER — GINSENG 100 MG
CAPSULE ORAL 2 TIMES DAILY
Status: DISCONTINUED | OUTPATIENT
Start: 2022-06-22 | End: 2022-06-22 | Stop reason: HOSPADM

## 2022-06-22 RX ORDER — LIDOCAINE 40 MG/G
CREAM TOPICAL
Status: DISCONTINUED | OUTPATIENT
Start: 2022-06-22 | End: 2022-06-22 | Stop reason: HOSPADM

## 2022-06-22 RX ORDER — METRONIDAZOLE 500 MG/100ML
500 INJECTION, SOLUTION INTRAVENOUS EVERY 8 HOURS
Status: DISCONTINUED | OUTPATIENT
Start: 2022-06-22 | End: 2022-06-22 | Stop reason: HOSPADM

## 2022-06-22 RX ORDER — ONDANSETRON 2 MG/ML
4 INJECTION INTRAMUSCULAR; INTRAVENOUS EVERY 6 HOURS PRN
Status: DISCONTINUED | OUTPATIENT
Start: 2022-06-22 | End: 2022-06-22 | Stop reason: HOSPADM

## 2022-06-22 RX ADMIN — CHLORHEXIDINE GLUCONATE 0.12% ORAL RINSE 15 ML: 1.2 LIQUID ORAL at 08:29

## 2022-06-22 RX ADMIN — CHLORHEXIDINE GLUCONATE 0.12% ORAL RINSE 15 ML: 1.2 LIQUID ORAL at 14:01

## 2022-06-22 RX ADMIN — METRONIDAZOLE 500 MG: 500 INJECTION, SOLUTION INTRAVENOUS at 08:29

## 2022-06-22 RX ADMIN — BACITRACIN: 500 OINTMENT TOPICAL at 08:29

## 2022-06-22 RX ADMIN — SODIUM CHLORIDE: 9 INJECTION, SOLUTION INTRAVENOUS at 01:23

## 2022-06-22 RX ADMIN — KETOROLAC TROMETHAMINE 15 MG: 30 INJECTION, SOLUTION INTRAMUSCULAR at 06:03

## 2022-06-22 RX ADMIN — WHITE PETROLATUM: 1.75 OINTMENT TOPICAL at 11:58

## 2022-06-22 RX ADMIN — METRONIDAZOLE 500 MG: 500 INJECTION, SOLUTION INTRAVENOUS at 00:09

## 2022-06-22 NOTE — ED TRIAGE NOTES
Per pt. She was recently seen for a dog bite on her face. Pt has multiple areas of stiches placed. Comes in today as when she was eating she felt like an area popped in her lower lip. Also with increased swelling to left side of the face. Stitches were placed 2 days ago.      Triage Assessment     Row Name 06/21/22 1958       Triage Assessment (Adult)    Airway WDL WDL       Respiratory WDL    Respiratory WDL WDL       Skin Circulation/Temperature WDL    Skin Circulation/Temperature WDL WDL       Cardiac WDL    Cardiac WDL WDL       Peripheral/Neurovascular WDL    Peripheral Neurovascular WDL WDL       Cognitive/Neuro/Behavioral WDL    Cognitive/Neuro/Behavioral WDL WDL

## 2022-06-22 NOTE — PLAN OF CARE
"Observation Goals    - Tolerating oral antibiotics or has plans for home infusion set up: not met, pt on IV abx  - Vital signs normal or at patient baseline: met, VS on RA O2 sat's above 92%  - Adequate pain control on oral analgesia: met, pt denies   - Infection is improving: Not met   - Color, warmth, movement, sensation of affected area or limb is intact or at baseline: not met   - Return to baseline functional status: met, at baseline  - Safe disposition plan has been identified: met, prior disposition    /81 (BP Location: Right arm, Patient Position: Semi-Alaniz's, Cuff Size: Adult Regular)   Pulse 70   Temp 98.4  F (36.9  C) (Axillary)   Resp 17   Ht 1.473 m (4' 10\")   Wt 58.5 kg (129 lb)   SpO2 100%   BMI 26.96 kg/m      "

## 2022-06-22 NOTE — PROGRESS NOTES
Discharge paperwork reviewed with patient. Pt has no other questions and understands. IV removed prior to leaving. Pt belongings sent with. Pt will be calling family for ride.

## 2022-06-22 NOTE — H&P
[facility]    History and Physical - ED Observation       Date of Admission:  6/21/2022    Assessment & Plan      Felisa Miramontes is a 22 year old female admitted on 6/21/2022. She Has no significant past medical history and  returns to the emergency department for evaluation of increased swelling at the site of dog bite.    ##Cellulitis  ##Dog Bite  22-year-old female who suffered a dog bite to the face on 6/19/2022 with multiple lacerations to the face and oral cavity.  These were sutured By ear nose and throat specialists and patient was sent home with prescription for Augmentin twice daily. She returns today due to increased swelling of the left side of her face.  In the ED, vital signs are stable.  Labs show sodium 135, creatinine 0.66, anion gap 9,.  There is no white count, WBC 7.5.  Hemoglobin stable.  CRP is elevated, 28.10.  Sed rate is 33.  Blood cultures are pending.  ENT evaluated patient in the ED and recommend IV antibiotics.  And further monitoring.  Medications: Toradol 15 mg IV, IVF bolus 1000 mL.  Plan: [place] to ED observation for further monitoring overnight and administration of antibiotics for management of cellulitis.  - ENT following, appreciate recs  - Ceftriaxone 2 g IV every 24 hours  - Flagyl 500 mg IV every 8 hours  - Aquaphor to incisions twice daily  - Peridex oral solution 3 times daily  - Toradol 15 mg IV Q 6 hours as needed  - Dilaudid 0.2 mg IV Q 3 hours as needed  - Follow blood cultures  - Continuous pulse ox     Diet: Mechanical/Dental Soft Diet  NPO per Anesthesia Guidelines for Procedure/Surgery Except for: Meds    DVT Prophylaxis: Low Risk/Ambulatory with no VTE prophylaxis indicated  Fang Catheter: Not present  Central Lines: None  Cardiac Monitoring: None  Code Status:   full    Clinically Significant Risk Factors Present on Admission                  # Overweight: Estimated body mass index is 26.96 kg/m  as  "calculated from the following:    Height as of this encounter: 1.473 m (4' 10\").    Weight as of this encounter: 58.5 kg (129 lb).      Disposition Plan   Expected Discharge: 1-2 days   Anticipated discharge location:  Awaiting care coordination huddle  Delays:     await improvement         The patient's care was discussed with the Bedside Nurse, Patient and ED MD, Dr Hamida Roa.    Luisa Quezada, [facility]  ED Observation, Ascom:[id number]  ______________________________________________________________________    Chief Complaint   Dog bite, cellulitis    History is obtained from the patient    History of Present Illness   Per ED, \"Felisa Miramontes is a 22 year old female who presents to the ED for evaluation of a dog bite laceration.  Patient's  reports the patient was bit by a large dog 2 days ago, and was seen here in the ED where she received stitches.  Patient's  reports since leaving the ED on Sunday the right facial swelling has gone down, but states today they noticed that she started to have left facial swelling and was having oral bleeding from some possible open lacerations in her mouth.  Patient denies fever, nausea, or vomiting.  States she has been taking her antibiotics and pain medicine that was given to her at her ED visit.  She states she has been eating a normal diet and is able to swallow secretions.  Patient denies any medical history and is not on any other medications.     Per chart review patient initially presented to [facility] on 6/19/2022 with significant facial trauma sustained from a dog bite.  With concern for possible facial fracture patient was transferred to [facility].  Patient was given Unasyn, Tdap, and morphine.  She underwent 32 cm of complex laceration closure.  Patient was discharged with 7 days of Augmentin, erythromycin ointment twice daily for 2 days and follow with Aquaphor " "ointment twice daily, and oxycodone 5 mg by mouth every 6 hours as needed for pain.\"    Review of Systems    Constitutional: Negative for fever.   HENT: Positive for facial swelling (Left- sided). Negative for congestion and trouble swallowing.    Eyes: Negative for redness.   Respiratory: Negative for shortness of breath.    Cardiovascular: Negative for chest pain.   Gastrointestinal: Negative for abdominal pain, nausea and vomiting.   Endocrine: Negative for polyuria.   Genitourinary: Negative for difficulty urinating.   Musculoskeletal: Negative for arthralgias and neck stiffness.   Skin: Positive for wound (Multiple Facial and oral lacerations). Negative for color change.   Allergic/Immunologic: Negative for immunocompromised state.   Neurological: Negative for headaches.   Hematological: Does not bruise/bleed easily.   Psychiatric/Behavioral: Negative for confusion.      A complete review of systems was performed with pertinent positives and negatives noted in the HPI, and all other systems negative.    Past Medical History    I have reviewed this patient's medical history and updated it with pertinent information if needed.   No past medical history on file.    Past Surgical History   I have reviewed this patient's surgical history and updated it with pertinent information if needed.  No past surgical history on file.    Social History   I have reviewed this patient's social history and updated it with pertinent information if needed.         Prior to Admission Medications   Prior to Admission Medications   Prescriptions Last Dose Informant Patient Reported? Taking?   amoxicillin-clavulanate (AUGMENTIN) 875-125 MG tablet   No No   Sig: Take 1 tablet by mouth 2 times daily for 7 days   chlorhexidine (PERIDEX) 0.12 % solution   No No   Sig: Swish and spit 15 mLs in mouth 2 times daily   erythromycin with ethanol (EMGEL) 2 % gel   No No   Sig: Apply topically daily   mineral oil-hydrophilic petrolatum (AQUAPHOR) " external ointment   No No   Sig: Apply topically 2 times daily   oxyCODONE (ROXICODONE) 5 MG tablet   No No   Sig: Take 1 tablet (5 mg) by mouth every 6 hours as needed for pain      Facility-Administered Medications: None     Allergies   No Known Allergies    Physical Exam   Vital Signs: Temp: 98.2  F (36.8  C) Temp src: Axillary BP: 114/85 Pulse: 82   Resp: 18 SpO2: 100 % O2 Device: None (Room air)    Weight: 129 lbs 0 oz    Constitutional: awake, alert, cooperative, no apparent distress, and appears stated age  Eyes: Lids and lashes normal, pupils equal, round and reactive to light, extra ocular muscles intact, sclera clear, conjunctiva normal  ENT: Normocephalic, left jaw and cheek are tender on palpation. Cheeks are [place], pt reports this is baseline.  external ears without lesions, oral pharynx with moist mucous membranes. Trismus, oral opening 1-2cm.  Respiratory: No increased work of breathing, good air exchange, clear to auscultation bilaterally, no crackles or wheezing  Cardiovascular: Normal apical impulse, regular rate and rhythm, normal S1 and S2, no S3 or S4, and no murmur noted  Abdomen: Normal bowel sounds, soft, non-distended, non-tender.  Skin: Multiple dog bites to face s/p repair on 6/19/21, dog bite to left side of the face with swelling. There is tenderness to palpation, induration, and erythema, but no evidence of fluctuance. Does not seem consistent with an abscess at this time.  See pictures in ED provider note.  No rash visualized, normal color.               Musculoskeletal: There is no redness, warmth, or swelling of the joints.  Full range of motion noted.  Motor strength is 5 out of 5 all extremities bilaterally.  Tone is normal.  Neurologic: Awake, alert, oriented to name, place and time.  Cranial nerves II-XII are grossly intact.  Motor is 5 out of 5 bilaterally.  Gait is normal.  Psych: normal affect, normal behavior    Data   Data reviewed today: I reviewed all medications, new labs  and imaging results over the last 24 hours. I personally reviewed no images or EKG's today.    Recent Labs   Lab 06/21/22  2109   WBC 7.5   HGB 12.5   MCV 89      *   POTASSIUM 4.1   CHLORIDE 99   CO2 27   BUN 11.5   CR 0.66   ANIONGAP 9   GUY 8.9   GLC 87     No results found for this or any previous visit (from the past 24 hour(s)).

## 2022-06-22 NOTE — PROGRESS NOTES
"Otolaryngology Progress Note  June 22, 2022    S: No acute events overnight. Still endorses significant pain in left perioral wound.     O: /59 (BP Location: Right arm)   Pulse 62   Temp 98.2  F (36.8  C) (Oral)   Resp 18   Ht 1.473 m (4' 10\")   Wt 58.5 kg (129 lb)   SpO2 100%   BMI 26.96 kg/m     General: Alert and oriented x 3, mildly anxious, laying in bed   HEENT: EOMI. Some weakness to left mouth movement. Incisions are clean, dry and intact with superficial nylon sutures holding well. Some edema present in left cheek.    Pulmonary: Breathing non-labored, no stridor, no accessory muscle use.    LABS:  ROUTINE IP LABS (Last four results)  BMPRecent Labs   Lab 06/22/22  0540 06/21/22  2109   * 135*   POTASSIUM 4.5 4.1   CHLORIDE 115* 99   GUY 8.6 8.9   CO2 31* 27   BUN 7.0 11.5   CR 0.53 0.66   GLC 83 87     CBC  Recent Labs   Lab 06/22/22  0540 06/21/22  2109   WBC 7.5 7.5   RBC 3.53* 4.28   HGB 10.3* 12.5   HCT 31.7* 38.0   MCV 90 89   MCH 29.2 29.2   MCHC 32.5 32.9   RDW 11.9 11.8    279     INRNo lab results found in last 7 days.    A/P: Felisa Miramontes is a 22 year old female who returns for worsening facial pain and swelling after having several facial lacerations repaired on 6/19 secondary to a dog-bite to the face. Currently it is not clear if the patient has a developing infection or swelling associated with the primary injury. The face appears indurated and erythematous but no evidence of fluctuance consistent with abscess on examination. IV CTX and flagyl started as swelling increased on augmentin alone.     Recommendations:   - Continue to be admitted to obs, consider discharging after one more dose of IV flagyl and if patient remains stable clinically  - No current recommendation of CT given stable appearance  - Pain per ED  - Aquaphor to incisions  - Peridex TID  - IV CTX/flagyl, transition to oral cefuroxime and flagyl if discharged home (total 14 days)  - Strict " return precautions: return if erythema and swelling worsen. Return immediately if pus develops anywhere in your wounds.  - Keep current follow up appointment    -- Patient and above plan to be discussed with Dr. Neli Merlos MD  Otolaryngology-Head & Neck Surgery PGY-1  Please contact ENT with questions by dialing [phone] and entering job [phone] when prompted.

## 2022-06-22 NOTE — CONSULTS
Otolaryngology Consult Note  June 21, 2022  CC: facial swelling    HPI: Felisa Miramontes is an otherwise healthy 22 year old female who unfortunately suffered several lacerations after a dog-bite to the face on 6/19, this was subsequently repaired with absorbable sutures for the intraoral component as well as non-absorbable nylon for the external facial lacerations. She was discharged home on Augmentin that she has taken twice a day and returns today due to concerns for worsening pain and swelling predominantly along her left laceration that began today. She has taken minimal PO secondary to pain and discomfort and feels that she has decreased mouth opening today compared to yesterday. She feels intermittent chills but denies any tactile fevers.    No past medical history on file.    No past surgical history on file.    Current Outpatient Medications   Medication Sig Dispense Refill     amoxicillin-clavulanate (AUGMENTIN) 875-125 MG tablet Take 1 tablet by mouth 2 times daily for 7 days 14 tablet 0     chlorhexidine (PERIDEX) 0.12 % solution Swish and spit 15 mLs in mouth 2 times daily 473 mL 0     erythromycin with ethanol (EMGEL) 2 % gel Apply topically daily 60 g 0     mineral oil-hydrophilic petrolatum (AQUAPHOR) external ointment Apply topically 2 times daily 198 g 0     oxyCODONE (ROXICODONE) 5 MG tablet Take 1 tablet (5 mg) by mouth every 6 hours as needed for pain 15 tablet 0        No Known Allergies    Social History     Socioeconomic History     Marital status:      Spouse name: Not on file     Number of children: Not on file     Years of education: Not on file     Highest education level: Not on file   Occupational History     Not on file   Tobacco Use     Smoking status: Not on file     Smokeless tobacco: Not on file   Substance and Sexual Activity     Alcohol use: Not on file     Drug use: Not on file     Sexual activity: Not on file   Other Topics Concern     Not on file   Social History  "Narrative     Not on file     Social Determinants of Health     Financial Resource Strain: Not on file   Food Insecurity: Not on file   Transportation Needs: Not on file   Physical Activity: Not on file   Stress: Not on file   Social Connections: Not on file   Intimate Partner Violence: Not on file   Housing Stability: Not on file       No family history on file.    ROS: 12 point review of systems is negative unless noted in HPI.    PHYSICAL EXAM:  /85   Pulse 82   Temp 98.2  F (36.8  C) (Axillary)   Resp 18   Ht 1.473 m (4' 10\")   Wt 58.5 kg (129 lb)   SpO2 100%   BMI 26.96 kg/m    General: laying in bed, no acute distress  HEAD: normocephalic  Face: scattered facial lacerations, right above and through the eyebrow, right infraorbital laceration repaired with nylon, both soft and flat, appropriately tender, no fluctuance. Right curvilinear laceration extending from right oral commissure towards cheek and inferiorly towards mentum, also appears soft with appropriate tenderness. Right cheek with some erythema. Left laceration repaired with nylon, surrounding edema, more tenderness with palpation, feels firm and indurated, no evidence of fluctuance.  Eyes: EOMI  Ears: external ears symmetric  Nose: no anterior drainage, right side of nasal dorsum laceration repaired with nylon sutures  Mouth: moist, limited opening due to pain, gingivobuccal sulcus with vicryl suture repair present, does not appear irritated or infected. No bleeding.  Neck: no LAD, trachea midline  Neuro: cranial nerves 2-12 grossly intact  Respiratory: breathing non-labored on RA, no stridor  Psych: pleasant affect  Cardio: extremities warm and well perfused     ROUTINE IP LABS (Last four results)  BMPNo lab results found in last 7 days.  CBCNo lab results found in last 7 days.  INRNo lab results found in last 7 days.    Imaging:  No results found for this or any previous visit.    Assessment and Plan  Felisa Miramontes is a 22 " year old female who returns for worsening facial pain and swelling after having several facial lacerations repaired on 6/19 secondary to a dog-bite to the face. Appears to have a cellulitis developing secondary to the dog-bite surrounding the left facial laceration, indurated and erythematous but no evidence of fluctuance consistent with abscess on examination. Recommend observation overnight for IV antibiotics and IVF for improved hydration given decreased PO intake.    - Recommend observation overnight with ED Obs  - No evidence of abscess at this time, however if not improving on IV antibiotics. Would consider CT at a later time  - Pain management per ED  - Aquaphor to incisions  - Peridex TID  - NPO at midnight, soft diet until then  - IV Ceftriaxone/Flagyl  - Please contact Otolaryngology on call with any questions or concerns    Patient and plan was discussed with staff Dr. Neli Son MD  Otolaryngology Head and Neck Surgery Resident, PGY-3  Please page on call Otolaryngology resident with questions.    Physician Attestation   I, Nicolasa Quinteros MD, personally examined and evaluated this patient.  I discussed the patient with the resident/fellow and care team, and agree with the assessment and plan of care as documented in the note of 6/21/22.      I personally reviewed vital signs, medications and labs.    Key findings: Patient presented with substantial induration and erythema consistent with facial cellulitis, has had significant improvement in the past 12+ hours. Reports ongoing pain, but with significant reduction since coming to ER. On exam, awake, conversant. Compared to photos taken at time of admission, obvious reduction of swelling and erythema of the left face. CN VII intact. Lacerations with intact closures, no purulence appreciated. No apparent fluid collections. Agree with plan for broad spectrum antibiotics and subsequent outpatient follow-up with ongoing clinical improvement.  Please contact our team with questions.    Nicolasa Quinteros MD  Date of Service (when I saw the patient): 06/22/22

## 2022-06-22 NOTE — PLAN OF CARE
"Observation Goals    - Tolerating oral antibiotics or has plans for home infusion set up: not met, pt on IV abx  - Vital signs normal or at patient baseline: met, VS on RA O2 sat's above 92%  - Adequate pain control on oral analgesia: met, pt denies  - Infection is improving: Not met   - Color, warmth, movement, sensation of affected area or limb is intact or at baseline: not met   - Return to baseline functional status: met, at baseline  - Safe disposition plan has been identified: met, prior disposition    /59 (BP Location: Right arm)   Pulse 62   Temp 98.2  F (36.8  C) (Oral)   Resp 18   Ht 1.473 m (4' 10\")   Wt 58.5 kg (129 lb)   SpO2 100%   BMI 26.96 kg/m      "

## 2022-06-22 NOTE — ED PROVIDER NOTES
ED Provider Note  [facility]      History     Chief Complaint   Patient presents with     Laceration     The history is provided by the patient, the spouse and medical records.     Felisa Miraomntes is a 22 year old female who presents to the ED for evaluation of a dog bite laceration.  Patient's  reports the patient was bit by a large dog 2 days ago, and was seen here in the ED where she received stitches.  Patient's  reports since leaving the ED on Sunday the right facial swelling has gone down, but states today they noticed that she started to have left facial swelling and was having oral bleeding from some possible open lacerations in her mouth.  Patient denies fever, nausea, or vomiting.  States she has been taking her antibiotics and pain medicine that was given to her at her ED visit.  She states she has been eating a normal diet and is able to swallow secretions.  Patient denies any medical history and is not on any other medications.    Per chart review patient initially presented to [facility] on 6/[facility]/2022 with significant facial trauma sustained from a dog bite.  With concern for possible facial fracture patient was transferred to [facility].  Patient was given Unasyn, Tdap, and morphine.  She underwent 32 cm of complex laceration closure.  Patient was discharged with 7 days of Augmentin, erythromycin ointment twice daily for 2 days and follow with Aquaphor ointment twice daily, and oxycodone 5 mg by mouth every 6 hours as needed for pain.    Past Medical History  No past medical history on file.  No past surgical history on file.  amoxicillin-clavulanate (AUGMENTIN) 875-125 MG tablet  chlorhexidine (PERIDEX) 0.12 % solution  erythromycin with ethanol (EMGEL) 2 % gel  mineral oil-hydrophilic petrolatum (AQUAPHOR) external ointment  oxyCODONE (ROXICODONE) 5 MG tablet      No Known Allergies  Family History  No family history on file.  Social History       Past  "medical history, past surgical history, medications, allergies, family history, and social history were reviewed with the patient. No additional pertinent items.       Review of Systems   Constitutional: Negative for fever.   HENT: Positive for facial swelling (Left- sided). Negative for congestion and trouble swallowing.    Eyes: Negative for redness.   Respiratory: Negative for shortness of breath.    Cardiovascular: Negative for chest pain.   Gastrointestinal: Negative for abdominal pain, nausea and vomiting.   Endocrine: Negative for polyuria.   Genitourinary: Negative for difficulty urinating.   Musculoskeletal: Negative for arthralgias and neck stiffness.   Skin: Positive for wound (Multiple Facial and oral lacerations). Negative for color change.   Allergic/Immunologic: Negative for immunocompromised state.   Neurological: Negative for headaches.   Hematological: Does not bruise/bleed easily.   Psychiatric/Behavioral: Negative for confusion.     A complete review of systems was performed with pertinent positives and negatives noted in the HPI, and all other systems negative.    Physical Exam   BP: 114/85  Pulse: 82  Temp: 98.2  F (36.8  C)  Resp: 18  Height: 147.3 cm (4' 10\")  Weight: 58.5 kg (129 lb)  SpO2: 100 %  Physical Exam  General: Afebrile, no acute distress   HEENT: Normocephalic, atraumatic, conjunctivae normal. MMM  Neck: non-tender, supple  Cardio: regular rate. regular rhythm   Resp: Normal work of breathing, no respiratory distress, lungs clear bilaterally, no wheezing, rhonchi, rales  Chest/Back: no visual signs of trauma, no CVA tenderness   Abdomen: soft, non distension, no tenderness, no peritoneal signs   Neuro: alert and fully oriented. CN II-XII grossly intact. Grossly normal strength and sensation in all extremities.   MSK: no deformities. Normal range of motion  Integumentary/Skin: multiple dog bites to face s/p repair on 6/19/21, dog bite to left side of the face with swelling, " tenderness to palpation, induration, and erythema, no evidence of fluctuance or be consistent with an abscess at this time.  See pictures below.  No rash visualized, normal color  Psych: normal affect, normal behavior              ED Course     8:48 PM  The patient was seen and examined by Hamida Roa MD in Room HW.   Procedures       Results for orders placed or performed during the hospital encounter of 06/21/22   Basic metabolic panel     Status: Abnormal   Result Value Ref Range    Creatinine 0.66 0.51 - 0.95 mg/dL    Sodium 135 (L) 136 - 145 mmol/L    Potassium 4.1 3.4 - 4.5 mmol/L    Urea Nitrogen 11.5 6.0 - 20.0 mg/dL    Chloride 99 98 - 107 mmol/L    Carbon Dioxide (CO2) 27 22 - 29 mmol/L    Anion Gap 9 7 - 15 mmol/L    Glucose 87 70 - 99 mg/dL    GFR Estimate >90 >60 mL/min/1.73m2    Calcium 8.9 8.6 - 10.0 mg/dL   CRP inflammation     Status: Abnormal   Result Value Ref Range    CRP Inflammation 28.10 (H) <5.00 mg/L   Erythrocyte sedimentation rate auto     Status: Abnormal   Result Value Ref Range    Erythrocyte Sedimentation Rate 33 (H) 0 - 20 mm/hr   CBC with platelets and differential     Status: None   Result Value Ref Range    WBC Count 7.5 4.0 - 11.0 10e3/uL    RBC Count 4.28 3.80 - 5.20 10e6/uL    Hemoglobin 12.5 11.7 - 15.7 g/dL    Hematocrit 38.0 35.0 - 47.0 %    MCV 89 78 - 100 fL    MCH 29.2 26.5 - 33.0 pg    MCHC 32.9 31.5 - 36.5 g/dL    RDW 11.8 10.0 - 15.0 %    Platelet Count 279 150 - 450 10e3/uL    % Neutrophils 58 %    % Lymphocytes 32 %    % Monocytes 8 %    % Eosinophils 2 %    % Basophils 0 %    % Immature Granulocytes 0 %    NRBCs per 100 WBC 0 <1 /100    Absolute Neutrophils 4.4 1.6 - 8.3 10e3/uL    Absolute Lymphocytes 2.4 0.8 - 5.3 10e3/uL    Absolute Monocytes 0.6 0.0 - 1.3 10e3/uL    Absolute Eosinophils 0.1 0.0 - 0.7 10e3/uL    Absolute Basophils 0.0 0.0 - 0.2 10e3/uL    Absolute Immature Granulocytes 0.0 <=0.4 10e3/uL    Absolute NRBCs 0.0 10e3/uL   CBC with platelets  differential     Status: None    Narrative    The following orders were created for panel order CBC with platelets differential.  Procedure                               Abnormality         Status                     ---------                               -----------         ------                     CBC with platelets and d...[[id number]]                      Final result                 Please view results for these tests on the individual orders.     Medications   cefTRIAXone (ROCEPHIN) 2 g vial to attach to  ml bag for ADULTS or NS 50 ml bag for PEDS (2 g Intravenous New Bag 6/21/22 2216)   metroNIDAZOLE (FLAGYL) infusion 500 mg (has no administration in time range)   0.9% sodium chloride BOLUS (1,000 mLs Intravenous New Bag 6/21/22 2216)     Followed by   sodium chloride 0.9% infusion (has no administration in time range)   chlorhexidine (PERIDEX) 0.12 % solution 15 mL (has no administration in time range)   ketorolac (TORADOL) injection 15 mg (15 mg Intravenous Given 6/21/22 2136)        Assessments & Plan (with Medical Decision Making)   Felisa Miramontes is a 22 year old female who presents to the ED for evaluation of left facial pain, swelling, status post recent dog bite on 6/19/2021.  Upon arrival patient is nontoxic-appearing, afebrile, moderate distress secondary to pain, swelling.  Patient has been taking her antibiotics as prescribed however notes increasing pain, redness, swelling.  I discussed with ENT who evaluated the patient in the emergency department.  Concern for left facial cellulitis, at this time low suspicion for abscess however will plan on admission for IV antibiotics, soft diet, n.p.o. at midnight, if no improvement after IV antibiotics will consider CT imaging.  I reviewed comprehensive labs which are remarkable for white blood cell count of 7.5, hemoglobin 12.5, no acute metabolic or electrolyte abnormality, CRP elevated at 28.1.  Patient treated with IV ceftriaxone and  Flagyl in the emergency department.  We will plan on admission to ED observation.  Discussed with nurse practitioner.  Patient understands and agrees with the plan.    I have reviewed the nursing notes. I have reviewed the findings, diagnosis, plan and need for follow up with the patient.    New Prescriptions    No medications on file       Final diagnoses:   Facial cellulitis   Infected dog bite of face, initial encounter       --  I, Cherelle Elias, am serving as a trained medical scribe to document services personally performed by Hamida Roa MD, based on the provider's statements to me.     IHamida MD, was physically present and have reviewed and verified the accuracy of this note documented by Cherelle Elias.    Hamida Roa MD  [facility] EMERGENCY DEPARTMENT  6/21/2022     Hamida Roa MD  06/21/22 4113

## 2022-06-22 NOTE — CONSULTS
GENERAL ID SERVICE CONSULTATION     Patient:  Felisa Miramontes   Date of birth 2000, Medical record number [id number]  Date of Visit:  06/22/2022  Date of Admission: 6/21/2022  Consult Requester:Marisabel Webster, *          Assessment and Recommendations:   ASSESSMENT:  1. Dog bite  2. Cellulitis    DISCUSSION:     Felisa Miramontes is a 22 yr old female with no significant past medical history who suffered dog bites to the face on 6/19/2022 which were repaired in the emergency department and she was discharged on Augmentin twice daily for 7 days.  She presented back to the ED on 6/21/2022 with worsening pain, swelling on the left side and intermittent chills but no fever.  She has been afebrile, white count of 7.5, downtrending CRP of 17.3 from 28.1, blood cultures in process. There is no evidence of acute abscess at this time, however the left wound is more cellulitic than the right. She was started on ceftriaxone and Flagyl. She does note improvement since receiving IV abx. She is being followed by the ENT service who did the initial repair and recommends discharge on cefuroxime and Flagyl for total of 14 days. This is appropriate antimicrobial therapy. Traditionally, we would recommend unasyn while inpatient and Augmentin for 7 days following a dog bite. I do not think the patient failed therapy with augmentin with the increase in swelling. This increase in swelling would be an expected course 3 days out from injury. However, the switch from augmentin to cefuroxime and flagyl is also acceptable. Other things to consider would be that if the sites do not improve, it is possible there is a deeper infection which would warrant a CT scan. I also do not think we are there yet to warrant further imaging given her clinical stability.     Another consideration is a MRSA nasal swab which could give information if she is colonized. If so, would be reasonable to add doxycycline in addition on discharge.      RECOMMENDATION:  1. MRSA nasal swab, if positive add doxycycline to discharge abx  2. Defer antibiotic management to ENT, current recommendations are reasonable.   3. If lack of improvement / worsening over the next few days, strict instructions to return for further imaging and evaluation.     Thank you for this consult. ID will sign off.     Patient was discussed with Dr. Ray Aguilar, DDS  OMFS PGY-1 rotating on [facility] Consult Service  ________________________________________________________________    Consult Question: dog bite to face with increased facial swelling.   Admission Diagnosis: Facial cellulitis [L03.211]  Infected dog bite of face, initial encounter [S01.85XA, L08.9, W54.0XXA]  Skin infection [L08.9]         History of Present Illness:     Felisa Miramontes is a 22 yr old female with no significant past medical history who on 6/19/2022 presented to the ED with dog bites from a large dog to bilateral face, right worse than left, which was repaired with absorbable sutures intraorally and nonabsorbable sutures extraorally, was discharged on Augmentin twice daily which she took appropriately and presented back to the ED on 6/21/2022 with worsening pain and swelling on the left side.  She reports intermittent chills but no fever.  She was admitted to the observation unit in the emergency department for IV antibiotics and fluids and was started on ceftriaxone and Flagyl.           Review of Systems:   CONSTITUTIONAL:  No fevers or chills  EYES: negative for icterus  ENT:  negative for hearing loss, tinnitus and sore throat  RESPIRATORY:  negative for cough with sputum and dyspnea  CARDIOVASCULAR:  negative for chest pain, dyspnea  GASTROINTESTINAL:  negative for nausea, vomiting, diarrhea and constipation  GENITOURINARY:  negative for dysuria  HEME:  No easy bruising  INTEGUMENT:  negative for rash and pruritus  NEURO:  Negative for headache         Past Medical  "History:   No past medical history on file.         Past Surgical History:   No past surgical history on file.         Family History:   Reviewed and non-contributory.   No family history on file.         Social History:     Social History     Tobacco Use     Smoking status: Not on file     Smokeless tobacco: Not on file   Substance Use Topics     Alcohol use: Not on file     History   Sexual Activity     Sexual activity: Not on file            Current Medications:       bacitracin   Topical BID     cefTRIAXone  2 g Intravenous Q24H     chlorhexidine  15 mL Swish & Spit TID     metroNIDAZOLE  500 mg Intravenous Q8H     mineral oil-hydrophilic petrolatum   Topical BID     sodium chloride (PF)  3 mL Intracatheter Q8H            Allergies:   No Known Allergies         Physical Exam:   Vitals were reviewed  Patient Vitals for the past 24 hrs:   BP Temp Temp src Pulse Resp SpO2 Height Weight   06/22/22 0900 103/61 98.4  F (36.9  C) Oral 54 18 100 % -- --   06/22/22 0600 106/59 98.2  F (36.8  C) Oral 62 18 100 % -- --   06/22/22 0104 119/81 98.4  F (36.9  C) Axillary 70 17 100 % 1.473 m (4' 10\") --   06/21/22 1959 114/85 98.2  F (36.8  C) Axillary 82 18 100 % 1.473 m (4' 10\") 58.5 kg (129 lb)       Physical Examination:  GENERAL:  well-developed, well-nourished, in bed in no acute distress.   HEENT:  Head is normocephalic, multiple dog bite wounds on bilateral face s/p repair with sutures, covered in ointment, some erythema and firm cellulitic changes surrounding left facial wound, no evidence of abscess.   EYES:  Eyes have anicteric sclerae without conjunctival injection or stigmata of endocarditis.    ENT:  Oropharynx is moist without exudates or ulcers. Tongue is midline, intraoral extension repaired with sutures.   NECK:  Supple. No cervical lymphadenopathy  LUNGS:  Clear to auscultation bilateral.   CARDIOVASCULAR:  Regular rate and rhythm with no murmurs, gallops or rubs.  ABDOMEN:  Normal bowel sounds, soft, " nontender. No appreciable hepatosplenomegaly  SKIN:  No acute rashes.  Line(s) are in place without any surrounding erythema or exudate. No stigmata of endocarditis. Lacerations repaired noted above.   NEUROLOGIC:  Grossly nonfocal. Active x4 extremities         Laboratory Data:     Inflammatory Markers    Recent Labs   Lab Test 06/22/22  0540 06/21/22 2109   SED  --  33*   CRP 17.30* 28.10*       Hematology Studies    Recent Labs   Lab Test 06/22/22 0540 06/21/22 2109 09/09/21  1512   WBC 7.5 7.5 8.5   HGB 10.3* 12.5 12.7   MCV 90 89 89    279 304       Metabolic Studies     Recent Labs   Lab Test 06/22/22 0540 06/21/22 2109   * 135*   POTASSIUM 4.5 4.1   CHLORIDE 115* 99   CO2 31* 27   BUN 7.0 11.5   CR 0.53 0.66   GFRESTIMATED >90 >90       Hepatic Studies  No lab results found.    Microbiology:  No results found for: CULT    Urine Studies  No lab results found.    Vancomycin Levels  No lab results found.    Invalid input(s): VANCO    Hepatitis B Testing No lab results found.  Hepatitis C Testing   No results found for: HCVAB, HQTG, HCGENO, HCPCR, HQTRNA, HEPRNA  Respiratory Virus Testing    No results found for: RS, FLUAG

## 2022-06-22 NOTE — DISCHARGE SUMMARY
[facility]  ED Obs Discharge Summary      Date of Admission:  6/21/2022  Date of Discharge:  No discharge date for patient encounter.  Discharging Provider: TIARRA Miller CNP  Discharge Service: ED Obs    Discharge Diagnoses   Felisa Miramontes is a 22 year old female admitted on 6/21/2022. She Has no significant past medical history and  returns to the emergency department for evaluation of increased swelling at the site of dog bite.     ##Cellulitis  ##Dog Bite  22-year-old female who suffered a dog bite to the face on 6/19/2022 with multiple lacerations to the face and oral cavity. She presented to [facility] whereby she received: Morphine, Tdap, Dilaudid, Unasyn, Dilaudid. She was then was transferred to the [facility] for ENT/Facial trauma to see. The dog was a cane Josesito.  It was her friend's dog.  The dog is up-to-date on vaccinations.  She suffered bites to her right eye chin, mouth, and left face.  Having difficulty opening her mouth due to the pain. She was evaluated by ENT. Bites were sutured by ear nose and throat specialists and patient was sent home with prescription for Augmentin twice daily. She returned on 6/21 due to increased swelling of the left side of her face. Labs show sodium 148, creatinine 0.66, anion gap 9.  There is no white count, WBC 7.5.  Hemoglobin stable.  CRP is elevated, 17.30. Anion gap 2, Bicarb 31, hgb 10.3  Blood cultures are pending.  ENT evaluated patient in the ED and recommend IV CTX/flagyl, transition to oral cefuroxime and flagyl if discharged home (total 14 days). ID consulted in ED Obs, recommended nasal swab for MRSA prior to discharge, otherwise agreed with recommendation for oral cefuroxime and Flagly upon discharge with plan to add Doxycycline if MRSA positive. Pain was well controlled overnight with IV Toradol. Swelling and erythema on face appeared improved compared with yesterday (improved from pictures in ED  note). Patient felt swelling had improved as well. Patient able to eat, no difficulty swallowing, no throat tightness or difficulty breathing.    - blood cultures negative after 12 hours, will continue to culture  -- MRSA nares pending at discharge  - Aquaphor to incisions  - Peridex TID  - tylenol/ibuprofen for pain  - oral cefuroxime and flagyl (total 14 days)  - Strict return precautions: return if erythema and swelling worsen. Return immediately if pus develops anywhere in your wounds.  - No current recommendation of CT given stable appearance  - Keep current follow up appointment      Follow-ups Needed After Discharge   Follow-up Appointments     Adult [facility] Follow-up and recommended labs and tests      Follow up in ENT clinic with Rachel Hook NP on 6/28/2022 at 3:10pm.   Please call the clinic with questions/concerns: [phone].    Otolaryngology/ENT Clinic:  [facility]  [address] [place] [address]      Appointments on [place] and/or [facility] (with [facility] or [facility]   provider or service). Call [phone] if you haven't heard regarding   these appointments within 7 days of discharge.         Adult [facility] Follow-up and recommended labs and tests      Follow up with Dr. Briceño , at [facility] 4th floor ENT clinic, on 6/28/22.             Unresulted Labs Ordered in the Past 30 Days of this Admission     Date and Time Order Name Status Description    6/22/2022  1:38 PM MRSA MSSA PCR, Nasal Swab In process     6/21/2022 11:03 PM Blood Culture Line, venous Preliminary     6/21/2022 11:03 PM Blood Culture Line, venous Preliminary       These results will be followed up per hospital protocol if positive, otherwise results can be reviewed at next appointment if negative.    Discharge Disposition   Discharged to home  Condition at discharge: Stable    Hospital Course        Consultations This Hospital Stay   INFECTIOUS DISEASE GENERAL  ADULT IP CONSULT    Code Status   Full Code    Time Spent on this Encounter   I, TIARRA Miller CNP, personally saw the patient today and spent greater than 30 minutes discharging this patient.       TIARRA Miller CNP  [facility] [address] [facility]  [address] [place] [address]  Phone: [phone]  Fax: [phone]  ______________________________________________________________________    Physical Exam   Vital Signs: Temp: 98.4  F (36.9  C) Temp src: Oral BP: 103/61 Pulse: 54   Resp: 18 SpO2: 100 % O2 Device: None (Room air)    Weight: 129 lbs 0 oz  Constitutional: awake, alert, cooperative, no apparent distress, and appears stated age  Eyes: Lids and lashes normal, pupils equal, round and reactive to light, extra ocular muscles intact, sclera clear, conjunctiva normal  ENT: Normocephalic, left jaw and cheek are tender on palpation. Cheeks are fr[place], pt reports this is baseline.  external ears without lesions, oral pharynx with moist mucous membranes. Trismus, oral opening 1-2cm.  Respiratory: No increased work of breathing, good air exchange, clear to auscultation bilaterally, no crackles or wheezing  Cardiovascular: Normal apical impulse, regular rate and rhythm, normal S1 and S2, no S3 or S4, and no murmur noted  Abdomen: Normal bowel sounds, soft, non-distended, non-tender.  Skin: Multiple dog bites to face s/p repair on 6/19/21, dog bite to left side of the face with swelling. There is tenderness to palpation, induration, and erythema, but no evidence of fluctuance. This appears improved today compared to pictures from ED provider note (See pictures in ED provider note).  No rash visualized, normal color.  Musculoskeletal: There is no redness, warmth, or swelling of the joints.  Full range of motion noted.  Motor strength is 5 out of 5 all extremities bilaterally.  Tone is normal.  Neurologic: Awake, alert, oriented to name, place and time.  Cranial nerves  "II-XII are grossly intact.  Motor is 5 out of 5 bilaterally.  Gait is normal.  Psych: normal affect, normal behavior       Primary Care Physician   Physician No Ref-Primary    Discharge Orders      When to contact your care team    Please notify your doctor if you experience wound breakdown, if your lacerations begin to separate from the sutures, sustained bleeding from the wound site, or increasing redness, swelling, and/or purulent malorodorous discharge from the wound site which may indicate infection. If you feel it is acute, or experience sudden changes in breathing, chest pain, or excessive sleepiness/somnolence please return to the emergency department or call 911. If you have questions or concerns during the day please call ENT clinic and [phone]. If at night you can call [facility] at [phone] and ask for the \"ENT resident on call\".     Adult [facility] Follow-up and recommended labs and tests    Follow up in ENT clinic with Rachel Hook NP on 6/28/2022 at 3:10pm. Please call the clinic with questions/concerns: [phone].    Otolaryngology/ENT Clinic:  [facility]  Clinics [facility]  [address] [place] [address]      Appointments on [place] and/or [facility] (with [facility] or [facility] provider or service). Call [phone] if you haven't heard regarding these appointments within 7 days of discharge.     Wound care and dressings    Instructions to care for your wound at home: Keep lacerations clean and dry. Apply Aquaphor ointment to lacerations three times daily to keep moist. You may shower, do not soak, scrub, or submerge lacerations under water.     Activity    Your activity upon discharge: activity as tolerated. Do not submerge bite wounds under water until sutures removed in clinic and they've given you the OK. No lake swimming until sutures removed.     Reason for your hospital stay    Dog bite to face     Adult [facility] Follow-up " and recommended labs and tests    Follow up with Dr. Briceño , at [facility] 4th floor ENT clinic, on 6/28/22.     When to contact your care team    Call your primary doctor if you have any of the following: temperature greater than 101.4, fevers, chills, increased drainage from wound, pus develops from wounds, increased swelling, difficulty swallowing or breathing, or increased pain.     Wound care and dressings    Instructions to care for your wound at home: as directed.     Diet    Follow this diet upon discharge: Regular       Significant Results and Procedures   Most Recent 3 CBC's:Recent Labs   Lab Test 06/22/22  0540 06/21/22 2109 09/09/21  1512   WBC 7.5 7.5 8.5   HGB 10.3* 12.5 12.7   MCV 90 89 89    279 304     Most Recent ESR & CRP:Recent Labs   Lab Test 06/22/22  0540 06/21/22 2109   SED  --  33*   CRP 17.30* 28.10*       Discharge Medications   Current Discharge Medication List      START taking these medications    Details   cefuroxime (CEFTIN) 500 MG tablet Take 1 tablet (500 mg) by mouth 2 times daily  Qty: 28 tablet, Refills: 0    Associated Diagnoses: Facial cellulitis; Infected dog bite of face, initial encounter; Skin infection      metroNIDAZOLE (FLAGYL) 500 MG tablet Take 1 tablet (500 mg) by mouth 3 times daily  Qty: 42 tablet, Refills: 0    Associated Diagnoses: Facial cellulitis; Infected dog bite of face, initial encounter; Skin infection         CONTINUE these medications which have NOT CHANGED    Details   chlorhexidine (PERIDEX) 0.12 % solution Swish and spit 15 mLs in mouth 2 times daily  Qty: 473 mL, Refills: 0      mineral oil-hydrophilic petrolatum (AQUAPHOR) external ointment Apply topically 2 times daily  Qty: 198 g, Refills: 0      oxyCODONE (ROXICODONE) 5 MG tablet Take 1 tablet (5 mg) by mouth every 6 hours as needed for pain  Qty: 15 tablet, Refills: 0         STOP taking these medications       amoxicillin-clavulanate (AUGMENTIN) 875-125 MG tablet Comments:   Reason for  Stopping:         erythromycin with ethanol (EMGEL) 2 % gel Comments:   Reason for Stopping:             Allergies   No Known Allergies

## 2022-06-22 NOTE — DISCHARGE INSTRUCTIONS
- Aquaphor to incisions  - Peridex rinse 3 times daily to inside of mouth  - Start taking oral cefuroxime and flagyl, take full course of both medications for full 14 days  - Return if redness and swelling worsen. Return immediately if pus develops anywhere in your wounds.  - Keep current follow up appointment

## 2022-06-27 LAB
BACTERIA BLD CULT: NO GROWTH
BACTERIA BLD CULT: NO GROWTH

## 2022-06-28 ENCOUNTER — OFFICE VISIT (OUTPATIENT)
Dept: OTOLARYNGOLOGY | Facility: CLINIC | Age: 22
End: 2022-06-28
Payer: COMMERCIAL

## 2022-06-28 VITALS
SYSTOLIC BLOOD PRESSURE: 125 MMHG | BODY MASS INDEX: 26.13 KG/M2 | HEART RATE: 77 BPM | DIASTOLIC BLOOD PRESSURE: 77 MMHG | WEIGHT: 125 LBS

## 2022-06-28 DIAGNOSIS — L08.9 INFECTED DOG BITE OF FACE, INITIAL ENCOUNTER: ICD-10-CM

## 2022-06-28 DIAGNOSIS — S01.85XA INFECTED DOG BITE OF FACE, INITIAL ENCOUNTER: ICD-10-CM

## 2022-06-28 DIAGNOSIS — L03.211 FACIAL CELLULITIS: Primary | ICD-10-CM

## 2022-06-28 DIAGNOSIS — S01.81XD FACIAL LACERATION, SUBSEQUENT ENCOUNTER: ICD-10-CM
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
## 2022-06-28 PROCEDURE — 99203 OFFICE O/P NEW LOW 30 MIN: CPT | Performed by: REGISTERED NURSE

## 2022-06-28 ASSESSMENT — PAIN SCALES - GENERAL: PAINLEVEL: SEVERE PAIN (6)

## 2022-06-28 NOTE — LETTER
6/28/2022       RE: Felisa Miramontes  [address]  [place] [address]     Dear Colleague,    Thank you for referring your patient, Felisa Miramontes, to the [facility] [place] [facility]. Please see a copy of my visit note below.    June 28, 2022    Prior Medical History: Felisa Miramontes is a 22 year old female with a history of multiple facial lacerations from a dog bite. These lacerations were repaired on 6/19/22. Patient was hospitalized overnight from 6/21-6/22 due to worsening perioral pain and edema. Patient treated with IV antibiotics and antifungal. ID consulted inpatient and patient transitioned to a 14-day course of oral cefuroxime and Flagly with instructions to follow up in clinic for recheck.     Interval History:   Patient comes in today for ED follow up. Today, patient states that she is doing well. Perioral pain and swelling is still present but improving. Pain is now mostly located along the anterior mandibular gingiva. Patient states she is able to eat very soft foods. Rinses mouth well with water after eating. Denies any bleeding or foul drainage from mouth. Facial lacerations without bleeding, swelling, or drainage. Numbness on chin, denies any facial weakness.    Patient is planning to relocate to [place] on Thursday due to  obligations. 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    Past Medical History:  No past medical history on file.    Past Surgical History:  No past surgical history on file.    Medications:  Current Outpatient Medications   Medication Sig Dispense Refill     cefuroxime (CEFTIN) 500 MG tablet Take 1 tablet (500 mg) by mouth 2 times daily 28 tablet 0     chlorhexidine (PERIDEX) 0.12 % solution Swish and spit 15 mLs in mouth 2 times daily 473 mL 0     metroNIDAZOLE (FLAGYL) 500 MG tablet Take 1 tablet (500 mg) by mouth 3 times  daily 42 tablet 0     mineral oil-hydrophilic petrolatum (AQUAPHOR) external ointment Apply topically 2 times daily 198 g 0       Allergies:  No Known Allergies   ROS: 10 point ROS neg other than the symptoms noted above in the HPI.    Physical Exam:    There were no vitals taken for this visit.  Wt Readings from Last 3 Encounters:   06/21/22 58.5 kg (129 lb)   06/19/22 58.9 kg (129 lb 13.6 oz)        Constitutional:  The patient was unaccompanied, well-groomed, and in no acute distress.     Neurologic: Alert and oriented x 3.  HB 1/6 bilaterally. Voice normal.   Psychiatric: The patient's affect was calm, cooperative, and appropriate.     Communication:  Normal; communicates verbally, normal voice quality.    Respiratory: Breathing comfortably without stridor or exertion of accessory muscles.    Head/Face:  Multiple facial lacerations with sutures that are c/d/i. No swelling or redness. Tenderness with palpation.   Eyes: Pupils were equal and reactive.  Extraocular movement intact.    Oral Cavity: Normal tongue, floor of mouth, and palate.  Anterior mandibular gingiva with multiple dissolvable sutures in place. No swelling, bleeding, or purulent drainage.      Procedure: Facial laceration sutures completely removed without complications. All lacerations well approximated without redness, swelling, or drainage.    Documents Reviewed:  ED and inpatient notes.    Assessment/Plan:  Patient with multiple facial lacerations due to dog bites. Patient is healing well without evidence of infection. Patient to continue prescribed antibiotics and Flagyl. Reviewed facial laceration care including proper sun protection. Will provide work release note for patient to avoid sun and keep laceration clean and dry for the next 2 weeks as she continues to heal. Patient can follow up with facial plastics as needed for scar revision. Numbness in chin should improve but nerve inflammation can take up to 1 year to heal.     Otherwise,  patient will follow up as needed.    Rachel Hook DNP, APRN, CNP  Otolaryngology  Head & Neck Surgery  [phone]    30 minutes spent on the date of the encounter doing chart review, history and exam, documentation and further activities per the note excluding time performing suture removal.      Chief Complaint   Patient presents with     RECHECK     Suture removal     Blood pressure 125/77, pulse 77, weight 56.7 kg (125 lb).    Mago Knott LPN        Again, thank you for allowing me to participate in the care of your patient.      Sincerely,    Lakshmi Hook, NP

## 2022-06-28 NOTE — LETTER
June 28, 2022      Felisa Miramontes  [address]   [place] [address]        To Whom It May Concern:    Felisa Miramontes was seen in our clinic. Due to wound healing, patient cannot participate in outdoor activities that include significant sun, wind, or dust until 7/12/22.      Sincerely,        Lakshmi Hook NP

## 2022-06-28 NOTE — PROGRESS NOTES
June 28, 2022    Prior Medical History: Felisa Miramontes is a 22 year old female with a history of multiple facial lacerations from a dog bite. These lacerations were repaired on 6/19/22. Patient was hospitalized overnight from 6/21-6/22 due to worsening perioral pain and edema. Patient treated with IV antibiotics and antifungal. ID consulted inpatient and patient transitioned to a 14-day course of oral cefuroxime and Flagly with instructions to follow up in clinic for recheck.     Interval History:   Patient comes in today for ED follow up. Today, patient states that she is doing well. Perioral pain and swelling is still present but improving. Pain is now mostly located along the anterior mandibular gingiva. Patient states she is able to eat very soft foods. Rinses mouth well with water after eating. Denies any bleeding or foul drainage from mouth. Facial lacerations without bleeding, swelling, or drainage. Numbness on chin, denies any facial weakness.    Patient is planning to relocate to [place] on Thursday due to  obligations. 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    Past Medical History:  No past medical history on file.    Past Surgical History:  No past surgical history on file.    Medications:  Current Outpatient Medications   Medication Sig Dispense Refill     cefuroxime (CEFTIN) 500 MG tablet Take 1 tablet (500 mg) by mouth 2 times daily 28 tablet 0     chlorhexidine (PERIDEX) 0.12 % solution Swish and spit 15 mLs in mouth 2 times daily 473 mL 0     metroNIDAZOLE (FLAGYL) 500 MG tablet Take 1 tablet (500 mg) by mouth 3 times daily 42 tablet 0     mineral oil-hydrophilic petrolatum (AQUAPHOR) external ointment Apply topically 2 times daily 198 g 0       Allergies:  No Known Allergies   ROS: 10 point ROS neg other than the symptoms noted above in the HPI.    Physical Exam:    There were no vitals taken for this visit.  Wt Readings from Last 3 Encounters:   06/21/22 58.5  kg (129 lb)   06/19/22 58.9 kg (129 lb 13.6 oz)        Constitutional:  The patient was unaccompanied, well-groomed, and in no acute distress.     Neurologic: Alert and oriented x 3.  HB 1/6 bilaterally. Voice normal.   Psychiatric: The patient's affect was calm, cooperative, and appropriate.     Communication:  Normal; communicates verbally, normal voice quality.    Respiratory: Breathing comfortably without stridor or exertion of accessory muscles.    Head/Face:  Multiple facial lacerations with sutures that are c/d/i. No swelling or redness. Tenderness with palpation.   Eyes: Pupils were equal and reactive.  Extraocular movement intact.    Oral Cavity: Normal tongue, floor of mouth, and palate.  Anterior mandibular gingiva with multiple dissolvable sutures in place. No swelling, bleeding, or purulent drainage.      Procedure: Facial laceration sutures completely removed without complications. All lacerations well approximated without redness, swelling, or drainage.    Documents Reviewed:  ED and inpatient notes.    Assessment/Plan:  Patient with multiple facial lacerations due to dog bites. Patient is healing well without evidence of infection. Patient to continue prescribed antibiotics and Flagyl. Reviewed facial laceration care including proper sun protection. Will provide work release note for patient to avoid sun and keep laceration clean and dry for the next 2 weeks as she continues to heal. Patient can follow up with facial plastics as needed for scar revision. Numbness in chin should improve but nerve inflammation can take up to 1 year to heal.     Otherwise, patient will follow up as needed.    Rachel Hook DNP, APRN, CNP  Otolaryngology  Head & Neck Surgery  [phone]    30 minutes spent on the date of the encounter doing chart review, history and exam, documentation and further activities per the note excluding time performing suture removal.

## 2022-06-28 NOTE — PROGRESS NOTES
Chief Complaint   Patient presents with     RECHECK     Suture removal     Blood pressure 125/77, pulse 77, weight 56.7 kg (125 lb).    Mago Knott LPN

## 2022-06-28 NOTE — LETTER
Date:July 6, 2022      Patient was self referred, no letter generated. Do not send.        [facility] Health Information

## 2023-03-09 ENCOUNTER — OFFICE VISIT (OUTPATIENT)
Dept: FAMILY MEDICINE | Facility: CLINIC | Age: 23
End: 2023-03-09
Payer: OTHER GOVERNMENT

## 2023-03-09 VITALS
BODY MASS INDEX: 30.23 KG/M2 | DIASTOLIC BLOOD PRESSURE: 78 MMHG | HEART RATE: 81 BPM | TEMPERATURE: 98.5 F | WEIGHT: 144 LBS | SYSTOLIC BLOOD PRESSURE: 120 MMHG | OXYGEN SATURATION: 99 % | RESPIRATION RATE: 12 BRPM | HEIGHT: 58 IN

## 2023-03-09 DIAGNOSIS — M54.42 CHRONIC BILATERAL LOW BACK PAIN WITH BILATERAL SCIATICA: ICD-10-CM

## 2023-03-09 DIAGNOSIS — M54.41 CHRONIC BILATERAL LOW BACK PAIN WITH BILATERAL SCIATICA: ICD-10-CM

## 2023-03-09 DIAGNOSIS — E05.90 HYPERTHYROIDISM: Primary | ICD-10-CM

## 2023-03-09 DIAGNOSIS — G89.29 CHRONIC BILATERAL LOW BACK PAIN WITH BILATERAL SCIATICA: ICD-10-CM

## 2023-03-09 PROBLEM — L08.9 SKIN INFECTION: Status: RESOLVED | Noted: 2022-06-22 | Resolved: 2023-03-09

## 2023-03-09 LAB — TSH SERPL DL<=0.005 MIU/L-ACNC: 1.54 UIU/ML (ref 0.3–4.2)

## 2023-03-09 PROCEDURE — 99204 OFFICE O/P NEW MOD 45 MIN: CPT | Performed by: FAMILY MEDICINE

## 2023-03-09 PROCEDURE — 36415 COLL VENOUS BLD VENIPUNCTURE: CPT | Performed by: FAMILY MEDICINE

## 2023-03-09 PROCEDURE — 84443 ASSAY THYROID STIM HORMONE: CPT | Performed by: FAMILY MEDICINE

## 2023-03-09 RX ORDER — GABAPENTIN 300 MG/1
300 CAPSULE ORAL
Qty: 30 CAPSULE | Refills: 1 | Status: SHIPPED | OUTPATIENT
Start: 2023-03-09 | End: 2023-03-22

## 2023-03-09 NOTE — PROGRESS NOTES
"  Assessment & Plan     (E05.90) Hyperthyroidism  (primary encounter diagnosis)  Comment: pt state she was told her thyroid test was not normal. She dose not remember the details. No history of thyroid disease. She has hair loss and fatigue. Non smoker.  Plan: TSH with free T4 reflex        Will follow-up lab.     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 Walking for > 15 minutes she will have the pain. Rest no pain. She had x ray and physical therapy and pain improved. But she moved to [place] recently. She was told by previous provider that she had abnormal spine x ray and she needed mri. Pt tried Rx medication but did not help. She dose not remember the name.  No injury. With shooting pain to hip and leg above the knee. No fever, numbness and tingling. Good bladder and bm control . She is not pregnant. Likely she has DDD, disc herniated? Muscle strain etc   Plan: MR Lumbar Spine w/o Contrast, Physical Therapy         Referral, gabapentin (NEURONTIN) 300 MG capsule        Will follow-up spine mri. Will resumed physical therapy. Consider spine ortho referral. Advise rest and avoid heavy heavy lifting.    956}     BMI:   Estimated body mass index is 30.1 kg/m  as calculated from the following:    Height as of this encounter: 1.473 m (4' 10\").    Weight as of this encounter: 65.3 kg (144 lb).   Weight management plan: Discussed healthy diet and exercise guidelines        Return in about 4 weeks (around 4/6/2023) for Follow up.    Ximena Church MD  [facility]    Dalia Roberto is a 22 year old, presenting for the following health issues:  Consult (Pt reports she has thyroid concerns. Pt reports hair loss, extreme fatigue and bruising. Also, she has hip and LBP)      HPI     Hypothyroidism Follow-up       patient describes the following symptoms: " "Weight stable, has hair loss, no skin changes, no constipation, no loose stools    Chronic/Recurring Back Pain Follow Up      Where is your back pain located? (Select all that apply) low back bilateral    How would you describe your back pain?  dull ache    Where does your back pain spread? the right and left buttock and the right and left  thigh    Since your last clinic visit for back pain, how has your pain changed? unchanged    Does your back pain interfere with your job? YES    Since your last visit, have you tried any new treatment? Yes -  Physical Therapy        Review of Systems   Constitutional, HEENT, cardiovascular, pulmonary, gi and gu systems are negative, except as otherwise noted.      Objective    /78 (BP Location: Right arm, Patient Position: Sitting, Cuff Size: Adult Regular)   Pulse 81   Temp 98.5  F (36.9  C) (Oral)   Resp 12   Ht 1.473 m (4' 10\")   Wt 65.3 kg (144 lb)   LMP 02/09/2023 (Exact Date)   SpO2 99%   BMI 30.10 kg/m    Body mass index is 30.1 kg/m .  Physical Exam   GENERAL: healthy, alert and no distress  NECK: no adenopathy, no asymmetry, masses, or scars and thyroid normal to palpation  RESP: lungs clear to auscultation - no rales, rhonchi or wheezes  CV: regular rate and rhythm, normal S1 S2, no S3 or S4, no murmur, click or rub, no peripheral edema and peripheral pulses strong  ABDOMEN: soft, nontender, no hepatosplenomegaly, no masses and bowel sounds normal  MS: no gross musculoskeletal defects noted, no edema  Cervical, thoracic and lumbar spine exam: with mild tenderness at low back, no masses or kyphoscoliosis. Full range of motion without pain is noted.   raised leg test positive bilateral at 45 degree. Normal sensation, strength and reflex. Normal gait       "

## 2023-03-13 ENCOUNTER — TELEPHONE (OUTPATIENT)
Dept: FAMILY MEDICINE | Facility: CLINIC | Age: 23
End: 2023-03-13

## 2023-03-13 NOTE — TELEPHONE ENCOUNTER
----- Message from Ximena Church MD sent at 3/10/2023  9:50 AM CST -----  Please inform pt that her thyroid function test is normal. Good news.     Ximena Church MD on 3/10/2023 at 9:50 AM;

## 2023-03-13 NOTE — TELEPHONE ENCOUNTER
Left message to call back for: Patient  Information to relay to patient: Results below.    Carri Knutson CMA.

## 2023-03-16 ENCOUNTER — TELEPHONE (OUTPATIENT)
Dept: FAMILY MEDICINE | Facility: CLINIC | Age: 23
End: 2023-03-16

## 2023-03-16 NOTE — TELEPHONE ENCOUNTER
Reason for Call:  Appointment Request    Patient requesting this type of appt:  Pregnancy     Requested provider: Pt requested [facility] clinic    Reason patient unable to be scheduled: Pregnancy confirmation    When does patient want to be seen/preferred time: NA    Comments: Pt took a pregnancy test one week ago & it was positive. Would like to schedule an appointment to make sure this is correct - pregnancy confirmation.    Okay to leave a detailed message?: Yes at Home number on file [phone] (home)    Call taken on 3/16/2023 at 12:42 PM by Leisa Montoya

## 2023-03-17 ENCOUNTER — HOSPITAL ENCOUNTER (OUTPATIENT)
Dept: MRI IMAGING | Facility: HOSPITAL | Age: 23
Discharge: HOME | End: 2023-03-17
Attending: FAMILY MEDICINE | Admitting: FAMILY MEDICINE
Payer: OTHER GOVERNMENT

## 2023-03-17 DIAGNOSIS — M54.42 CHRONIC BILATERAL LOW BACK PAIN WITH BILATERAL SCIATICA: ICD-10-CM

## 2023-03-17 DIAGNOSIS — G89.29 CHRONIC BILATERAL LOW BACK PAIN WITH BILATERAL SCIATICA: ICD-10-CM

## 2023-03-17 DIAGNOSIS — M54.41 CHRONIC BILATERAL LOW BACK PAIN WITH BILATERAL SCIATICA: ICD-10-CM

## 2023-03-17 PROCEDURE — 72148 MRI LUMBAR SPINE W/O DYE: CPT

## 2023-03-20 ENCOUNTER — TELEPHONE (OUTPATIENT)
Dept: NURSING | Facility: CLINIC | Age: 23
End: 2023-03-20

## 2023-03-20 ENCOUNTER — TELEPHONE (OUTPATIENT)
Dept: FAMILY MEDICINE | Facility: CLINIC | Age: 23
End: 2023-03-20

## 2023-03-20 DIAGNOSIS — M51.26 PROTRUSION OF LUMBAR INTERVERTEBRAL DISC: Primary | ICD-10-CM

## 2023-03-20 NOTE — TELEPHONE ENCOUNTER
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  I will referral her to see spine ortho. The office will call her for the appointment .      Please inform pt that since she is pregnant now, she should stop taking gabapentin. Advise to take over the counter prenatal vitamins daily.     Ximena Church MD on 3/20/2023 at 10:45 AM;

## 2023-03-20 NOTE — TELEPHONE ENCOUNTER
Pt called back and was given provider msg and phone # to call if she does not hear from someone w/in 2 days.    Aliyah Diaz, RN, BSN  [facility] Nurse Advisor 11:33 AM 3/20/2023

## 2023-03-22 ENCOUNTER — OFFICE VISIT (OUTPATIENT)
Dept: FAMILY MEDICINE | Facility: CLINIC | Age: 23
End: 2023-03-22
Payer: OTHER GOVERNMENT

## 2023-03-22 VITALS
RESPIRATION RATE: 14 BRPM | SYSTOLIC BLOOD PRESSURE: 108 MMHG | TEMPERATURE: 98.3 F | HEIGHT: 58 IN | HEART RATE: 82 BPM | DIASTOLIC BLOOD PRESSURE: 60 MMHG | BODY MASS INDEX: 29.39 KG/M2 | WEIGHT: 140 LBS | OXYGEN SATURATION: 99 %

## 2023-03-22 DIAGNOSIS — Z32.01 PREGNANCY TEST POSITIVE: Primary | ICD-10-CM

## 2023-03-22 LAB — HCG UR QL: POSITIVE

## 2023-03-22 PROCEDURE — 99213 OFFICE O/P EST LOW 20 MIN: CPT | Performed by: PHYSICIAN ASSISTANT

## 2023-03-22 PROCEDURE — 81025 URINE PREGNANCY TEST: CPT | Performed by: PHYSICIAN ASSISTANT

## 2023-03-22 ASSESSMENT — ENCOUNTER SYMPTOMS
HEMATOLOGIC/LYMPHATIC NEGATIVE: 1
CONSTITUTIONAL NEGATIVE: 1
RESPIRATORY NEGATIVE: 1
DYSURIA: 0
MUSCULOSKELETAL NEGATIVE: 1
NEUROLOGICAL NEGATIVE: 1

## 2023-03-22 NOTE — PROGRESS NOTES
"  Assessment & Plan       ICD-10-CM    1. Pregnancy test positive  Z32.01 HCG qualitative urine     HCG qualitative urine          KYLE Ronquillo [facility] TIM Roberto is a 22 year old, presenting for the following health issues:  Confirmation Of Pregnancy (Home test positive 2 weeks ago )    Additional Questions 3/22/2023   Roomed by Gen RUIZ CMA   Accompanied by spouse & child     HPI     {SUPERLIST (Optional):099356}  {additonal problems for provider to add (Optional):281742}      Review of Systems   {ROS COMP (Optional):135513}      Objective    /60   Pulse 82   Temp 98.3  F (36.8  C) (Oral)   Resp 14   Ht 1.473 m (4' 10\")   Wt 63.5 kg (140 lb)   LMP 02/09/2023 (Exact Date)   SpO2 99%   BMI 29.26 kg/m    Body mass index is 29.26 kg/m .  Physical Exam   {Exam List (Optional):624452}    {Diagnostic Test Results (Optional):971033}    {AMBULATORY ATTESTATION (Optional):011919}            "

## 2023-03-22 NOTE — PROGRESS NOTES
"  Assessment & Plan       ICD-10-CM    1. Pregnancy test positive  Z32.01 HCG qualitative urine     HCG qualitative urine        #1 positive pregnancy test  Positive pregnancy test in the clinic today.  She had a home positive pregnancy test 2 weeks ago.  Last menstrual period 2/9/2023.  This puts her at about 5 weeks and 6 days.  She is going to start prenatal vitamin she discontinued gabapentin that she was previously prescribed.  We discussed no smoking or alcohol while she is pregnant.  Symptoms for evaluation were discussed.  She is not on any chronic daily medications.  He does have a history of gestational diabetes and preeclampsia with her first pregnancy.  She is feeling well otherwise.  I did get her set up to see Dr. Jaime April 14th at 12 PM for her first OB visit.    Recent Results (from the past 240 hour(s))   HCG qualitative urine    Collection Time: 03/22/23 11:34 AM   Result Value Ref Range    hCG Urine Qualitative Positive (A) Negative       KYLE Ronquillo [facility] TIM Roberto is a 22 year old, presenting for the following health issues:  Confirmation Of Pregnancy (Home test positive 2 weeks ago )    Additional Questions 3/22/2023   Roomed by Gen RUIZ CMA   Accompanied by spouse & child       Review of Systems   Constitutional: Negative.    HENT: Negative.    Respiratory: Negative.    Genitourinary: Negative for dysuria, pelvic pain, urgency, vaginal bleeding, vaginal discharge and vaginal pain.   Musculoskeletal: Negative.    Neurological: Negative.    Hematological: Negative.             Objective    /60   Pulse 82   Temp 98.3  F (36.8  C) (Oral)   Resp 14   Ht 1.473 m (4' 10\")   Wt 63.5 kg (140 lb)   LMP 02/09/2023 (Exact Date)   SpO2 99%   BMI 29.26 kg/m    Body mass index is 29.26 kg/m .  Physical Exam  Vitals and nursing note reviewed.   Constitutional:       Appearance: Normal appearance.   HENT:      Head: Normocephalic and " atraumatic.   Eyes:      Conjunctiva/sclera: Conjunctivae normal.   Cardiovascular:      Rate and Rhythm: Normal rate.      Heart sounds: No murmur heard.    No friction rub. No gallop.   Pulmonary:      Effort: Pulmonary effort is normal.      Breath sounds: No wheezing, rhonchi or rales.   Skin:     General: Skin is warm.   Neurological:      General: No focal deficit present.      Mental Status: She is alert.      Motor: No weakness.      Gait: Gait normal.      Deep Tendon Reflexes: Reflexes normal.   Psychiatric:         Mood and Affect: Mood normal.         Behavior: Behavior normal.         Thought Content: Thought content normal.         Judgment: Judgment normal.

## 2023-04-07 ENCOUNTER — HOSPITAL ENCOUNTER (OUTPATIENT)
Dept: PHYSICAL THERAPY | Facility: REHABILITATION | Age: 23
Discharge: HOME | End: 2023-04-07
Payer: OTHER GOVERNMENT

## 2023-04-07 DIAGNOSIS — M54.42 CHRONIC BILATERAL LOW BACK PAIN WITH BILATERAL SCIATICA: Primary | ICD-10-CM

## 2023-04-07 DIAGNOSIS — M54.41 CHRONIC BILATERAL LOW BACK PAIN WITH BILATERAL SCIATICA: Primary | ICD-10-CM

## 2023-04-07 DIAGNOSIS — G89.29 CHRONIC BILATERAL LOW BACK PAIN WITH BILATERAL SCIATICA: Primary | ICD-10-CM

## 2023-04-07 PROCEDURE — 97161 PT EVAL LOW COMPLEX 20 MIN: CPT | Mod: GP | Performed by: PHYSICAL THERAPIST

## 2023-04-07 PROCEDURE — 97110 THERAPEUTIC EXERCISES: CPT | Mod: GP | Performed by: PHYSICAL THERAPIST

## 2023-04-11 NOTE — PROGRESS NOTES
"      DISCHARGE  Reason for Discharge: Patient chooses to discontinue therapy.    Equipment Issued: NA    Discharge Plan: Patient to continue home program.    Referring Provider:  Ximena Church               04/07/23 1200   General Information   Type of Visit Initial OP Ortho PT Evaluation   Start of Care Date 04/07/23   Referring Physician Dr. Ximena Church   Patient/Family Goals Statement \"show me ways to relieve pain\"   Orders Evaluate and Treat   Date of Order 03/09/23   Certification Required? No   Medical Diagnosis Chronic bilateral low back pain with bilateral sciatica   General Information Comments Felisa is a pleasant active 23 y/o female active in the .  She complains of bilateral low back pain and bilateral radicular symptoms.  She is also currently pregnant.   Body Part(s)   Body Part(s) Lumbar Spine/SI   Presentation and Etiology   Impairments A. Pain;D. Decreased ROM;E. Decreased flexibility;F. Decreased strength and endurance;K. Numbness;L. Tingling   Functional Limitations perform activities of daily living;perform required work activities;perform desired leisure / sports activities   Symptom Location bilateral low back, bilateral LE   Onset date of current episode/exacerbation 03/09/23  (about a year ago)   Chronicity Chronic   Pain rating (0-10 point scale) Best (/10);Worst (/10);Other   Best (/10) 3   Worst (/10) 10   Pain rating comment 4   Fall Risk Screen   Fall screen completed by PT   Have you fallen 2 or more times in the past year? No   Have you fallen and had an injury in the past year? No   Is patient a fall risk? No   Abuse Screen (yes response referral indicated)   Feels Unsafe at Home or Work/School no   Feels Threatened by Someone no   Does Anyone Try to Keep You From Having Contact with Others or Doing Things Outside Your Home? no   Physical Signs of Abuse Present no   Patient needs abuse support services and resources No   System Outcome Measures   Outcome Measures   (ESTEFANIA 52%) "   Lumbar Spine/SI Objective Findings   Hamstring Flexibility mod   Hip Flexor Flexibility mod   Quadricep Flexibility min   Piriformis Flexibility mod   Flexion ROM min   Extension ROM mod   Right Side Bending ROM mod severe   Left Side Bending ROM mod severe   Hip Flexion (L2) Strength left 4-, right 4   Hip Abduction Strength bilateral 4   Hip Adduction Strength bilateral 4-   Hip Extension Strength bilateral 4   Knee Flexion Strength bilateral 5   Knee Extension (L3) Strength bilateral 5   Ankle Dorsiflexion (L4) Strength bilateral 5   Planned Therapy Interventions   Planned Therapy Interventions joint mobilization;manual therapy;neuromuscular re-education;ROM;strengthening;stretching   Clinical Impression   Criteria for Skilled Therapeutic Interventions Met yes, treatment indicated   PT Diagnosis Chronic bilateral low back pain with bilateral sciatica   Influenced by the following impairments bilateral low back pain, bilateral LE radicular pain, decreased flexibility, decreased strength   Functional limitations due to impairments difficulty sitting, walking, lifting   Clinical Presentation Stable/Uncomplicated   Clinical Decision Making (Complexity) Low complexity   Therapy Frequency 1 time/week   Predicted Duration of Therapy Intervention (days/wks) 90 days   Risk & Benefits of therapy have been explained Yes   Patient, Family & other staff in agreement with plan of care Yes   Clinical Impression Comments Patient presentation consistent with medical diagnosis.   ORTHO GOALS   PT Ortho Eval Goals 1;2;3   Ortho Goal 1   Goal Identifier HEP   Goal Description patient will be independent with HEP and self management of symptoms   Target Date 07/06/23   Ortho Goal 2   Goal Identifier sitting   Goal Description Patient will sit for 30 minutes with pain 2/10 or less   Target Date 07/06/23   Ortho Goal 3   Goal Identifier walking   Goal Description Patient will walk for 15 minutes with pain 3/10 or less   Target Date  07/06/23   Total Evaluation Time   PT Eval, Low Complexity Minutes (87159) 30

## 2023-04-13 LAB
ABO/RH(D): NORMAL
ANTIBODY SCREEN: NEGATIVE
SPECIMEN EXPIRATION DATE: NORMAL

## 2023-04-14 ENCOUNTER — HOSPITAL ENCOUNTER (OUTPATIENT)
Dept: ULTRASOUND IMAGING | Facility: CLINIC | Age: 23
Discharge: HOME | End: 2023-04-14
Attending: FAMILY MEDICINE | Admitting: FAMILY MEDICINE
Payer: OTHER GOVERNMENT

## 2023-04-14 ENCOUNTER — OFFICE VISIT (OUTPATIENT)
Dept: FAMILY MEDICINE | Facility: CLINIC | Age: 23
End: 2023-04-14
Payer: OTHER GOVERNMENT

## 2023-04-14 DIAGNOSIS — O09.90 SUPERVISION OF HIGH RISK PREGNANCY, ANTEPARTUM: Primary | ICD-10-CM

## 2023-04-14 DIAGNOSIS — Z11.3 SCREENING FOR STDS (SEXUALLY TRANSMITTED DISEASES): ICD-10-CM

## 2023-04-14 DIAGNOSIS — Z32.01 PREGNANCY TEST POSITIVE: ICD-10-CM

## 2023-04-14 DIAGNOSIS — B37.31 CANDIDIASIS OF VAGINA: ICD-10-CM

## 2023-04-14 DIAGNOSIS — O09.899 HISTORY OF PRETERM DELIVERY, CURRENTLY PREGNANT: ICD-10-CM

## 2023-04-14 DIAGNOSIS — R10.2 PELVIC PAIN IN FEMALE: ICD-10-CM

## 2023-04-14 LAB
ALBUMIN UR-MCNC: NEGATIVE MG/DL
APPEARANCE UR: CLEAR
BILIRUB UR QL STRIP: NEGATIVE
CLUE CELLS: ABNORMAL
COLOR UR AUTO: YELLOW
ERYTHROCYTE [DISTWIDTH] IN BLOOD BY AUTOMATED COUNT: 12 % (ref 10–15)
GLUCOSE UR STRIP-MCNC: NEGATIVE MG/DL
HBA1C MFR BLD: 5.4 % (ref 0–5.6)
HCT VFR BLD AUTO: 34.7 % (ref 35–47)
HGB BLD-MCNC: 11.6 G/DL (ref 11.7–15.7)
HGB UR QL STRIP: NEGATIVE
KETONES UR STRIP-MCNC: NEGATIVE MG/DL
LEUKOCYTE ESTERASE UR QL STRIP: NEGATIVE
MCH RBC QN AUTO: 29.8 PG (ref 26.5–33)
MCHC RBC AUTO-ENTMCNC: 33.4 G/DL (ref 31.5–36.5)
MCV RBC AUTO: 89 FL (ref 78–100)
NITRATE UR QL: NEGATIVE
PH UR STRIP: 6 [PH] (ref 5–8)
PLATELET # BLD AUTO: 305 10E3/UL (ref 150–450)
RBC # BLD AUTO: 3.89 10E6/UL (ref 3.8–5.2)
SP GR UR STRIP: 1.02 (ref 1–1.03)
TRICHOMONAS, WET PREP: ABNORMAL
TSH SERPL DL<=0.005 MIU/L-ACNC: 0.39 UIU/ML (ref 0.3–4.2)
UROBILINOGEN UR STRIP-ACNC: 0.2 E.U./DL
WBC # BLD AUTO: 8.9 10E3/UL (ref 4–11)
WBC'S/HIGH POWER FIELD, WET PREP: ABNORMAL
YEAST, WET PREP: PRESENT

## 2023-04-14 PROCEDURE — 86901 BLOOD TYPING SEROLOGIC RH(D): CPT | Performed by: FAMILY MEDICINE

## 2023-04-14 PROCEDURE — 87086 URINE CULTURE/COLONY COUNT: CPT | Performed by: FAMILY MEDICINE

## 2023-04-14 PROCEDURE — 86762 RUBELLA ANTIBODY: CPT | Performed by: FAMILY MEDICINE

## 2023-04-14 PROCEDURE — 86900 BLOOD TYPING SEROLOGIC ABO: CPT | Performed by: FAMILY MEDICINE

## 2023-04-14 PROCEDURE — 87088 URINE BACTERIA CULTURE: CPT | Performed by: FAMILY MEDICINE

## 2023-04-14 PROCEDURE — 87389 HIV-1 AG W/HIV-1&-2 AB AG IA: CPT | Performed by: FAMILY MEDICINE

## 2023-04-14 PROCEDURE — 87591 N.GONORRHOEAE DNA AMP PROB: CPT | Performed by: FAMILY MEDICINE

## 2023-04-14 PROCEDURE — 87491 CHLMYD TRACH DNA AMP PROBE: CPT | Performed by: FAMILY MEDICINE

## 2023-04-14 PROCEDURE — 36415 COLL VENOUS BLD VENIPUNCTURE: CPT | Performed by: FAMILY MEDICINE

## 2023-04-14 PROCEDURE — 99207 PR FIRST OB VISIT: CPT | Performed by: FAMILY MEDICINE

## 2023-04-14 PROCEDURE — 76801 OB US < 14 WKS SINGLE FETUS: CPT

## 2023-04-14 PROCEDURE — 87340 HEPATITIS B SURFACE AG IA: CPT | Performed by: FAMILY MEDICINE

## 2023-04-14 PROCEDURE — 81003 URINALYSIS AUTO W/O SCOPE: CPT | Performed by: FAMILY MEDICINE

## 2023-04-14 PROCEDURE — 86780 TREPONEMA PALLIDUM: CPT | Performed by: FAMILY MEDICINE

## 2023-04-14 PROCEDURE — 87210 SMEAR WET MOUNT SALINE/INK: CPT | Performed by: FAMILY MEDICINE

## 2023-04-14 PROCEDURE — 86850 RBC ANTIBODY SCREEN: CPT | Performed by: FAMILY MEDICINE

## 2023-04-14 PROCEDURE — 83036 HEMOGLOBIN GLYCOSYLATED A1C: CPT | Performed by: FAMILY MEDICINE

## 2023-04-14 PROCEDURE — 99214 OFFICE O/P EST MOD 30 MIN: CPT | Performed by: FAMILY MEDICINE

## 2023-04-14 PROCEDURE — 86803 HEPATITIS C AB TEST: CPT | Performed by: FAMILY MEDICINE

## 2023-04-14 PROCEDURE — 85027 COMPLETE CBC AUTOMATED: CPT | Performed by: FAMILY MEDICINE

## 2023-04-14 PROCEDURE — 84443 ASSAY THYROID STIM HORMONE: CPT | Performed by: FAMILY MEDICINE

## 2023-04-14 RX ORDER — MICONAZOLE NITRATE 2 %
1 CREAM WITH APPLICATOR VAGINAL AT BEDTIME
Qty: 45 G | Refills: 0 | Status: SHIPPED | OUTPATIENT
Start: 2023-04-14 | End: 2023-04-21

## 2023-04-14 RX ORDER — ASPIRIN 81 MG/1
81 TABLET, CHEWABLE ORAL DAILY
Qty: 90 TABLET | Refills: 3 | Status: ON HOLD | OUTPATIENT
Start: 2023-05-05 | End: 2023-11-12

## 2023-04-14 RX ORDER — PRENATAL VIT/IRON FUM/FOLIC AC 27MG-0.8MG
1 TABLET ORAL DAILY
Qty: 90 TABLET | Refills: 3 | Status: SHIPPED | OUTPATIENT
Start: 2023-04-14

## 2023-04-14 NOTE — LETTER
April 17, 2023      Felisa Miramontes  [address] [place] [address]        Dear Ms.Moreno Miramontes,    We are writing to inform you of your test results.    With the exception of the yeast and the bacteria in your bladder,  your prenatal labs were all normal which is great news.     Resulted Orders   NEISSERIA GONORRHOEA PCR   Result Value Ref Range    Neisseria gonorrhoeae Negative Negative      Comment:      Negative for N. gonorrhoeae rRNA by transcription mediated amplification. A negative result by transcription mediated amplification does not preclude the presence of C. trachomatis infection because results are dependent on proper and adequate collection, absence of inhibitors and sufficient rRNA to be detected.   CHLAMYDIA TRACHOMATIS PCR   Result Value Ref Range    Chlamydia trachomatis Negative Negative      Comment:      A negative result by transcription mediated amplification does not preclude the presence of C. trachomatis infection because results are dependent on proper and adequate collection, absence of inhibitors and sufficient rRNA to be detected.   Urine Culture Aerobic Bacterial   Result Value Ref Range    Culture (A)      10,000-50,000 CFU/mL Streptococcus agalactiae (Group B Streptococcus)      Comment:        This organism is susceptible to ampicillin, penicillin, vancomycin and the cephalosporins. If treatment is required AND your patient is allergic to penicillin, contact the Microbiology Lab within 5 days to request susceptibility testing.This organ ism may be significant in OB patients, however, it is part of the normal urogenital meryl.    Culture 10,000-50,000 CFU/mL Mixture of urogenital meryl    Urinalysis Macroscopic - lab collect   Result Value Ref Range    Color Urine Yellow Colorless, Straw, Light Yellow, Yellow    Appearance Urine Clear Clear    Glucose Urine Negative Negative mg/dL    Bilirubin Urine Negative Negative    Ketones Urine Negative Negative mg/dL    Specific  Gravity Urine 1.025 1.005 - 1.030    Blood Urine Negative Negative    pH Urine 6.0 5.0 - 8.0    Protein Albumin Urine Negative Negative mg/dL    Urobilinogen Urine 0.2 0.2, 1.0 E.U./dL    Nitrite Urine Negative Negative    Leukocyte Esterase Urine Negative Negative   Hepatitis C Screen Reflex to HCV RNA Quant and Genotype   Result Value Ref Range    Hepatitis C Antibody Nonreactive Nonreactive    Narrative    Assay performance characteristics have not been established for newborns, infants, and children.   Hepatitis B surface antigen   Result Value Ref Range    Hepatitis B Surface Antigen Nonreactive Nonreactive   CBC with platelets   Result Value Ref Range    WBC Count 8.9 4.0 - 11.0 10e3/uL    RBC Count 3.89 3.80 - 5.20 10e6/uL    Hemoglobin 11.6 (L) 11.7 - 15.7 g/dL    Hematocrit 34.7 (L) 35.0 - 47.0 %    MCV 89 78 - 100 fL    MCH 29.8 26.5 - 33.0 pg    MCHC 33.4 31.5 - 36.5 g/dL    RDW 12.0 10.0 - 15.0 %    Platelet Count 305 150 - 450 10e3/uL   HIV Antigen Antibody Combo   Result Value Ref Range    HIV Antigen Antibody Combo Nonreactive Nonreactive      Comment:      HIV-1 p24 Ag & HIV-1/HIV-2 Ab Not Detected   Rubella Antibody IgG   Result Value Ref Range    Rubella Alis IgG Instrument Value 8.28 <0.90 Index    Rubella Antibody IgG Positive       Comment:      Suggests previous exposure or immunization and probable immunity.   Treponema Abs w Reflex to RPR and Titer   Result Value Ref Range    Treponema Antibody Total Nonreactive Nonreactive   Hemoglobin A1c   Result Value Ref Range    Hemoglobin A1C 5.4 0.0 - 5.6 %      Comment:      Normal <5.7%   Prediabetes 5.7-6.4%    Diabetes 6.5% or higher     Note: Adopted from ADA consensus guidelines.   TSH with free T4 reflex   Result Value Ref Range    TSH 0.39 0.30 - 4.20 uIU/mL   Adult Type and Screen   Result Value Ref Range    ABO/RH(D) O POS     Antibody Screen Negative Negative    SPECIMEN EXPIRATION DATE [id number]    Wet prep - lab collect   Result Value  Ref Range    Trichomonas Absent Absent    Yeast Present (A) Absent    Clue Cells Absent Absent    WBCs/high power field 1+ (A) None       If you have any questions or concerns, please call the clinic at the number listed above.       Sincerely,      Jasmin Jaime MD

## 2023-04-14 NOTE — PATIENT INSTRUCTIONS
Nonprescription Medications Thought To Be Safe In Pregnancy (take medication according to package directions)     NAUSEA AND MOTION SICKNESS   Vitamin C 500 mg, once a day with food   Vitamin B6 50 mg, one three times a day   Unisom tablets (not gel tabs) - 1/2 to 1 tab at bedtime; may also take 1/2 tab in the morning and mid-afternoon   Dramamine   Sea Bands   Akosua tablets     CONGESTION AND COLDS   Chlortrimeton   Benadryl   Vicks Vapor Rub   Cough Drops  Avoid Robitussin and Mucinex in 1st trimester but otherwise safe during rest of pregnancy  Avoid pseudoephedrine      ALLERGIES   Alavert   Claritin   Tavist   Benadryl   Zyrtec     HEADACHES   Tylenol 325 mg 2-3 four times a day   Tylenol 500 mg 1-2 four times a day   DO NOT EXCEED 4,000 MG A DAY  DO NOT TAKE IBUPROFEN, MOTRIN, ADVIL, ASPIRIN, OR ALEVE      VAGINAL YEAST INFECTION   Monistat 3 or 7   Gyne-Lotrimin     HEMORRHOIDS   Preparation-H   Anusol   Anusol HC     HEARTBURN   Tums   Maalox (tablets or liquid)   Rolaids   Mylanta   Gaviscon   Pepcid AC  NO PEPTOBISMOL OR ALKASELTZER (contains aspirin)      DIARRHEA (do not treat for the first 24-48 hrs)   Kaopectate   Imodium AD     CONSTIPATION   Colace (Docusate Sodium) - stool softener   Jody-Colace (Colace + mild stimulant)   Any fiber supplement (Metamucil, Fibercon ,etc.)     GAS   Gas-X   Mylanta II with Simethicone   Mylicon     INSECT BITES   Lotions: Calamine, Caladryl, Benadryl   Oral: Benadryl tabs 25-50mg (every 6-8hrs)

## 2023-04-14 NOTE — PROGRESS NOTES
Assessment & Plan     Supervision of high risk pregnancy, antepartum  Initial labs done per orders    Prenatal vitamins.  Problem list reviewed and updated.  First trimester screening/AFP3 discussed: would like however, not yet 10 weeks, can consider when they return from [place].  Role of ultrasound in pregnancy discussed; fetal survey: ordered.  Amniocentesis discussed: not indicated.  General educational information given, including benefits of breast-feeding. See AVS for details.   Follow up in 4 weeks. 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 Ok to resume care with me, discussed need to be seen in four weeks either here or there.   Discussed starting ASA at 12 weeks due to her h/o pre-eclampsia. Discussed no indication for progesterone therapy at this time anymore. Truly, her labor was induced due to the preeclampsia as well.   With h/o GDM, checking A1c today.   - Hepatitis C Screen Reflex to HCV RNA Quant and Genotype  - US OB <14 Weeks w Transvaginal Single  - ABO/Rh type and screen  - Hepatitis B surface antigen  - CBC with platelets  - HIV Antigen Antibody Combo  - Rubella Antibody IgG  - Treponema Abs w Reflex to RPR and Titer  - Urine Culture Aerobic Bacterial  - Hemoglobin A1c  - TSH with free T4 reflex  - Urinalysis Macroscopic - lab collect  - aspirin (ASA) 81 MG chewable tablet  Dispense: 90 tablet; Refill: 3  - Prenatal Vit-Fe Fumarate-FA (PRENATAL MULTIVITAMIN W/IRON) 27-0.8 MG tablet  Dispense: 90 tablet; Refill: 3  - Urine Culture Aerobic Bacterial  - Urinalysis Macroscopic - lab collect  - Hepatitis C Screen Reflex to HCV RNA Quant and Genotype  - ABO/Rh type and screen  - Hepatitis B surface antigen  - CBC with platelets  - HIV Antigen Antibody Combo  - Rubella Antibody IgG  - Treponema Abs w Reflex to RPR and Titer  - Hemoglobin A1c  - TSH with free T4 reflex    History of  delivery, currently pregnant  -Induced due to pre-eclampsia with  severe features later than 34 weeks.     Pelvic pain in female  -Will check labs given her symptoms, positive wet prep, will treat with miconazole as listed.   - Wet prep - lab collect  - Wet prep - lab collect    Candidiasis of vagina  - miconazole (MICONAZOLE 7) 2 % cream  Dispense: 45 g; Refill: 0    Screening for STDs (sexually transmitted diseases)  - NEISSERIA GONORRHOEA PCR  - CHLAMYDIA TRACHOMATIS PCR  - NEISSERIA GONORRHOEA PCR  - CHLAMYDIA TRACHOMATIS PCR               Jasmin Jaime MD  [facility]    Dalia Roberto is a 22 year old, presenting for the following health issues:  Prenatal Care (1st OB Pain in her abdominal and pelvic every day )        4/14/2023    12:07 PM   Additional Questions   Roomed by Janeth     History of Present Illness       Reason for visit:  Pregnancy check up    She eats 2-3 servings of fruits and vegetables daily.She consumes 0 sweetened beverage(s) daily.She exercises with enough effort to increase her heart rate 20 to 29 minutes per day.  She exercises with enough effort to increase her heart rate 4 days per week.   She is taking medications regularly.            Felisa Miramontes is being seen today for her first obstetrical visit.  This is a planned pregnancy. She is at 9-1 gestation. Her obstetrical history is significant for h/o pre-eclampsia. Relationship with FOB: partnered. Patient does intend to breast feed. Pregnancy history fully reviewed.        Current symptoms also include: fatigue.  Sexual Activity: single partner, contraception - none.   Patient's last menstrual period was 02/09/2023 (exact date).. Last period was normal.      She has had a lot of pain in the vaginal area. It has been about 2-3 weeks. It comes and goes, happens daily at least and will last for five minutes when it happens. She is having no discharge, pain with urination. It does feel inside and at the lower pubic area. It's like a sharp pain. It happens  when she is moving or when she coughs.     Pre-eclampsia Risk Factors  High risk factors (1+):previous pregnancy with preeclampsia (especially when accompanied by an adverse outcome)  Moderate risk factors (2+): NONE      Infection History   - Live with someone with TB or exposed to TB No  - Patient reported with history of genital herpes No  - Rash or viral illness since LMP   No  - Prior GBS infected child    No  - Hepatitis B or C     No  - Gonorrhea      No  - Chlamydia      No  - HPV       No  - HIV       No  - Syphilis      No  - Other STI's      No  - Other (see comments)    No        Genetic Screening/Teratology Counseling  - Patient's age 35 years or older as of estimated date of delivery  No  - Thalassemia    (Italian, Greek, Mediterranean, or  background) No  - MCV less than 80       No   - Neural tube defects    (meningomyelocele, spina bifida, or anencephaly)  No  - Congenital heart defect      No  - Down syndrome       No  - Evin-Sachs (Ashkenazi Zoroastrian, Cajun, Maldivian Hutchinson)   No  - Canavan Disease (Ashkenazi Zoroastrian)    No  - Familial Dysautonomia (Ashkenazi Zoroastrian)    No  - Sickle cell disease or trait ()     No  - Hemophilia or other blood disorders    No  - Muscular dystrophy       No  - Cystic fibrosis       No  - Harriet's chorea       No  - Mental retardation/autism       No   (If yes, was the patient tested for fragile X?)     - Other inherited genetic or chromosomal disorders   No  - Maternal metabolic disorders (type 1 diabetes, PKU)  No  - Patient or baby's father had a child with birth defects   No  - Recurrent pregnancy loss, or stillbirth    No  - Medications   (including supplements, vitamins, herbs, OTC drugs)   /illicit/recreational drugs/alcohol since last LMP    list agents and strength, dosing    none  Any other (see comments)      No      Discussed in today's office visit  HIV and other routine prenatal tests     Yes  Risk Factors Identified by Prenatal  "history   Yes  Anticipated course of prenatal care    Yes  Nutrition and weight gain counseling: special diet   Yes  Toxoplasmosis precautions (Cats/Raw meat)  Yes  Sexual activity       Yes  Exercise       Yes  Influenza vaccine      Yes  Smoking counseling      Yes  Environmental/work hazards     Yes  Travel        Yes  Tobacco (asked, advise, assess, assist and arrange) Yes  Alcohol       Yes  Illicit/recreational drugs     Yes  Use of any meds   (including supplements, vitamins, herbs, OTC drugs) Yes  Indications for ultrasound     Yes  Domestic violence      Yes  Seat Belt Use        Yes  Childbirth classes/hospital facilities    Yes  Dental care       Yes  Avoidance of saunas or hot tubs    Yes  Teratogens        Yes  Breast-feeding       Yes  Screening for aneuploidy     Yes      Review of Systems   Per above      Objective    /60   Pulse 77   Temp 98.2  F (36.8  C)   Resp 14   Ht 1.473 m (4' 10\")   Wt 63 kg (139 lb)   LMP 02/09/2023 (Exact Date)   SpO2 99%   BMI 29.05 kg/m    Body mass index is 29.05 kg/m .  Physical Exam     /60   Pulse 77   Temp 98.2  F (36.8  C)   Resp 14   Ht 1.473 m (4' 10\")   Wt 63 kg (139 lb)   LMP 02/09/2023 (Exact Date)   SpO2 99%   BMI 29.05 kg/m    General: alert, pleasant, and no distress  Head: Normocephalic, without obvious abnormality, atraumatic  Eyes: conjunctivae and sclerae normal and pupils equal, round, reactive to   light and accomodation  Ears: normal TM's and external ear canals both ears  Nose: no discharge, no sinus tenderness  Throat: lips, mucosa, and tongue normal; teeth and gums normal  Neck: no adenopathy, supple, symmetrical, trachea midline and thyroid normal  Back: symmetric, ROM normal. No CVA tenderness.  Lungs: clear to auscultation bilaterally  Breasts: deferred  Heart:: regular rate and rhythm and no murmurs  Abdomen:  bowel sounds normal, no masses palpable and soft, non-tender  Pelvic: deferred  Extremities: extremities " normal, atraumatic, no cyanosis or edema  Pulses: 2+ and symmetric  Skin: Skin color, texture, turgor normal. No rashes or lesions  Lymph nodes: Cervical, supraclavicular, and axillary nodes normal.  Neurologic: Grossly normal

## 2023-04-15 LAB
C TRACH DNA SPEC QL NAA+PROBE: NEGATIVE
N GONORRHOEA DNA SPEC QL NAA+PROBE: NEGATIVE
T PALLIDUM AB SER QL: NONREACTIVE

## 2023-04-16 LAB
BACTERIA UR CULT: ABNORMAL
BACTERIA UR CULT: ABNORMAL

## 2023-04-17 ENCOUNTER — TELEPHONE (OUTPATIENT)
Dept: FAMILY MEDICINE | Facility: CLINIC | Age: 23
End: 2023-04-17

## 2023-04-17 VITALS
BODY MASS INDEX: 29.18 KG/M2 | TEMPERATURE: 98.2 F | SYSTOLIC BLOOD PRESSURE: 100 MMHG | HEIGHT: 58 IN | HEART RATE: 77 BPM | RESPIRATION RATE: 14 BRPM | WEIGHT: 139 LBS | DIASTOLIC BLOOD PRESSURE: 60 MMHG | OXYGEN SATURATION: 99 %

## 2023-04-17 DIAGNOSIS — O09.90 SUPERVISION OF HIGH RISK PREGNANCY, ANTEPARTUM: ICD-10-CM

## 2023-04-17 DIAGNOSIS — R10.2 PELVIC PAIN IN FEMALE: Primary | ICD-10-CM

## 2023-04-17 PROBLEM — O14.90 PRE-ECLAMPSIA: Status: RESOLVED | Noted: 2019-09-12 | Resolved: 2023-04-17

## 2023-04-17 PROBLEM — O14.90 PRE-ECLAMPSIA: Status: ACTIVE | Noted: 2019-09-12

## 2023-04-17 PROBLEM — O09.899 HISTORY OF PRETERM DELIVERY, CURRENTLY PREGNANT: Status: ACTIVE | Noted: 2023-04-17

## 2023-04-17 PROBLEM — O24.415 GESTATIONAL DIABETES MELLITUS (GDM) IN THIRD TRIMESTER CONTROLLED ON ORAL HYPOGLYCEMIC DRUG: Status: ACTIVE | Noted: 2019-08-26

## 2023-04-17 PROBLEM — O24.415 GESTATIONAL DIABETES MELLITUS (GDM) IN THIRD TRIMESTER CONTROLLED ON ORAL HYPOGLYCEMIC DRUG: Status: RESOLVED | Noted: 2019-08-26 | Resolved: 2023-04-17

## 2023-04-17 PROBLEM — L90.5 SCAR OF FACE: Status: ACTIVE | Noted: 2022-06-21

## 2023-04-17 PROBLEM — R20.2 FACIAL PARESTHESIA: Status: ACTIVE | Noted: 2023-04-17

## 2023-04-17 LAB
HBV SURFACE AG SERPL QL IA: NONREACTIVE
HCV AB SERPL QL IA: NONREACTIVE
HIV 1+2 AB+HIV1 P24 AG SERPL QL IA: NONREACTIVE
RUBV IGG SERPL QL IA: 8.28 INDEX
RUBV IGG SERPL QL IA: POSITIVE

## 2023-04-17 NOTE — TELEPHONE ENCOUNTER
Called and left detailed msg on VM. Instructed pt to call back to report if still having pelvic pain and if she wants antibiotics sent in for the group b strep infection in urine. Confirm pharmacy.    Ok to relay msg per Dr. Jaime when pt calls back    Estela Carvajal MA on 4/17/2023 at 5:52 PM

## 2023-04-17 NOTE — TELEPHONE ENCOUNTER
----- Message from Jasmin Jaime MD sent at 4/17/2023  5:28 PM CDT -----  Please call patient, she does have a group b strep infection in her urine. Is she still having the lower pelvic pain? If she is, I can send in antibiotics for this. We don't always treat this though.     If she does still have pain, what pharmacy would she like use to send this to?     The rest of her labs were all normal though which is great news.

## 2023-04-19 RX ORDER — CEPHALEXIN 500 MG/1
500 CAPSULE ORAL 4 TIMES DAILY
Qty: 40 CAPSULE | Refills: 0 | Status: ON HOLD | OUTPATIENT
Start: 2023-04-19 | End: 2023-11-12

## 2023-04-19 NOTE — TELEPHONE ENCOUNTER
Patient does have pelvic pain on and off. Would like prescription sent to [facility] in [place]. Patient is not in state.

## 2023-08-11 ENCOUNTER — PRENATAL OFFICE VISIT (OUTPATIENT)
Dept: FAMILY MEDICINE | Facility: CLINIC | Age: 23
End: 2023-08-11
Payer: OTHER GOVERNMENT

## 2023-08-11 VITALS
WEIGHT: 144 LBS | RESPIRATION RATE: 14 BRPM | HEIGHT: 59 IN | OXYGEN SATURATION: 98 % | HEART RATE: 78 BPM | SYSTOLIC BLOOD PRESSURE: 92 MMHG | DIASTOLIC BLOOD PRESSURE: 58 MMHG | TEMPERATURE: 97.9 F | BODY MASS INDEX: 29.03 KG/M2

## 2023-08-11 DIAGNOSIS — O09.90 SUPERVISION OF HIGH RISK PREGNANCY, ANTEPARTUM: Primary | ICD-10-CM

## 2023-08-11 PROCEDURE — 99207 PR PRENATAL VISIT: CPT | Performed by: FAMILY MEDICINE

## 2023-08-11 NOTE — PROGRESS NOTES
Doing well.  No concerns today.  Prenatal flowsheet information is reviewed.  Reportable signs and symptoms discussed.    She is doing well generally.     She does not have questions today. Is having a girl. She is not feeling baby move super well at this time.     She does have some some discomfort inher stomach, does think that it is stretching. She does have no blurry vision, headaches, ruq pain or swelling.     She will f/up in two weeks for her next visit, plan on 28 week GTT at that time.

## 2023-08-17 ENCOUNTER — TELEPHONE (OUTPATIENT)
Dept: FAMILY MEDICINE | Facility: CLINIC | Age: 23
End: 2023-08-17

## 2023-09-12 ENCOUNTER — OFFICE VISIT (OUTPATIENT)
Dept: FAMILY MEDICINE | Facility: CLINIC | Age: 23
End: 2023-09-12
Payer: OTHER GOVERNMENT

## 2023-09-12 ENCOUNTER — NURSE TRIAGE (OUTPATIENT)
Dept: FAMILY MEDICINE | Facility: CLINIC | Age: 23
End: 2023-09-12

## 2023-09-12 VITALS
OXYGEN SATURATION: 98 % | BODY MASS INDEX: 30.7 KG/M2 | WEIGHT: 152 LBS | SYSTOLIC BLOOD PRESSURE: 96 MMHG | DIASTOLIC BLOOD PRESSURE: 60 MMHG | RESPIRATION RATE: 15 BRPM | HEART RATE: 92 BPM

## 2023-09-12 DIAGNOSIS — O09.90 SUPERVISION OF HIGH RISK PREGNANCY, ANTEPARTUM: Primary | ICD-10-CM
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
## 2023-09-12 PROCEDURE — 99207 PR PRENATAL VISIT: CPT | Performed by: FAMILY MEDICINE

## 2023-09-12 NOTE — PROGRESS NOTES
"  Assessment & Plan     Supervision of high risk pregnancy, antepartum  -Doing well    Fall, initial encounter  NST in clinic normal, pelvic exam normal.  Given the fact that she is 3 days out from her fall I do not suspect we need to do any further evaluation at this time.  She will let me know if she starts having any bleeding or spotting or decreased fetal movement.  She has an appointment scheduled for 2 weeks and we can reevaluate at that time.         BMI:   Estimated body mass index is 30.7 kg/m  as calculated from the following:    Height as of 8/11/23: 1.499 m (4' 11\").    Weight as of this encounter: 68.9 kg (152 lb).   Weight management plan: Discussed healthy diet and exercise guidelines        Jasmin Jaime MD  [facility]    Dalia Roberto is a 23 year old, presenting for the following health issues:  Prenatal Care (30w 5d. Fell a few days ago down the stairs. )      9/12/2023     4:00 PM   Additional Questions   Roomed by Janeth       History of Present Illness       Reason for visit:  Follow up    She eats 2-3 servings of fruits and vegetables daily.She consumes 0 sweetened beverage(s) daily.She exercises with enough effort to increase her heart rate 20 to 29 minutes per day.  She exercises with enough effort to increase her heart rate 4 days per week.   She is taking medications regularly.     She slipped down the stairs a few days ago and fell on her bottom. She does think that this was 3 days ago. She does note that she felt ok until she started moving then had some pain.     She does have pain in the front of her stomach, it comes and goes. She does have no predictable pattern t the pain. She is eating well, drinking ok.    She is feeling baby move well. She has had a headache. She has had no blurry vision, ruq pain. She does have tension in her temple.     Reportable signs and symptoms discussed.  Prenatal flowsheet information is reviewed.  Doing well.  No " concerns today.  Discussed kick counts and fetal movement.        Review of Systems   Per above      Objective    BP 96/60   Pulse 92   Resp 15   Wt 68.9 kg (152 lb)   LMP 02/09/2023 (Exact Date)   SpO2 98%   BMI 30.70 kg/m    Body mass index is 30.7 kg/m .  Physical Exam   GENERAL: healthy, alert and no distress  NECK: no adenopathy, no asymmetry, masses, or scars and thyroid normal to palpation  RESP: lungs clear to auscultation - no rales, rhonchi or wheezes  CV: regular rate and rhythm, normal S1 S2, no S3 or S4, no murmur, click or rub, no peripheral edema and peripheral pulses strong  ABDOMEN: soft, nontender, no hepatosplenomegaly, no masses and bowel sounds normal, no uterine tenderness to palpation  Vaginal exam reveals a closed cervix, somewhat softened, thick and posterior  MS: no gross musculoskeletal defects noted, no edema

## 2023-09-12 NOTE — TELEPHONE ENCOUNTER
Nurse Triage SBAR    Is this a 2nd Level Triage? YES, LICENSED PRACTITIONER REVIEW IS REQUIRED    Situation: Patient reports she fall on the stairs a couple days ago. She is having back pain and lower abdominal pain since the fall.     Background: Patient is 30w5d, MAGALIE 11/16/2023.     Assessment: Patient fell on her back, she did not fall on her abdomen. She has been having lower back and lower abdominal pain. Patient reports the abdominal pain comes and goes.    Patient states baby has been moving a lot. She denies any vaginal bleeding.     Protocol Recommended Disposition:   Go To LD Now    Recommendation: L&D and [facility] and [facility] are diverting patients at this time. Nurse at [facility] recommended having patient see primary OB in office due to several days since fall with no bleeding and continued fetal movement.     Will route to provider to see if patient should be seen in clinic or L&D.     Routed to provider    Does the patient meet one of the following criteria for ADS visit consideration? 16+ years old, with an [facility] PCP     TIP  Providers, please consider if this condition is appropriate for management at one of our Acute and Diagnostic Services sites.     If patient is a good candidate, please use dotphrase <dot>triageresponse and select Refer to ADS to document.     Reason for Disposition   Pregnant 20 or more weeks and fall to ground or floor (e.g., walking and tripped)    Additional Information   Negative: Major injury from dangerous force (e.g., fall > 10 feet or 3 meters)   Negative: Major bleeding (actively dripping or spurting) that can't be stopped   Negative: Shock suspected (e.g., cold/pale/clammy skin, too weak to stand, low BP, rapid pulse)   Negative: SEVERE abdominal pain   Negative: Vaginal bleeding and pregnant 20 or more weeks   Negative: Sounds like a life-threatening emergency to the triager   Negative: Abdomen injury is main concern   Negative: Patient has an injury to a specific  "part of the body (e.g., arm, leg, head)   Negative: Patient has a wound (abrasion, cut, puncture)   Negative: Vaginal bleeding and pregnant < 20 weeks (Exception: Single episode of spotting.)   Negative: Abdominal pain (not severe) and pregnant < 20 weeks   Negative: Abdominal pain (not severe) and present > 1 hour   Negative: Sounds like a serious injury to the triager    Answer Assessment - Initial Assessment Questions  1. MECHANISM: \"How did the fall happen?\"      Fell down stairs  2. DOMESTIC VIOLENCE SCREENING: \"Did you fall because someone pushed you or tried to hurt you?\"      No  3. ONSET: \"When did the fall happen?\" (e.g., minutes, hours, or days ago)      Couple days ago  4. LOCATION: \"What part of the body hit the ground?\" (e.g., back, buttocks, head, hips, knees, hands, head, stomach)      Back  5. INJURY: \"Did you get hurt when you fell? Are there any obvious injuries?\" If Yes, ask: \"What does the injury look like?\"      Back pain  6. PAIN: \"Is there any pain?\" If Yes, ask: \"How bad is the pain?\" (e.g., Scale 1-10; or mild,   moderate, severe)    - NONE (0): No pain.    - MILD (1-3): Doesn't interfere with normal activities.     - MODERATE (4-7): Interferes with normal activities or awakens from sleep     - SEVERE (8-10): Excruciating pain, unable to do any normal activities.       Pain with walking and standing  7. SIZE: For cuts, bruises, or swelling, ask: \"How large is it?\" (e.g., inches or centimeters)      No  8. MAGALIE: \"What date are you expecting to deliver?\"      November 16, 2023  9. FETAL MOVEMENT: \"Has the baby's movement decreased or changed significantly from   normal?\"      Moving a lot  10. TETANUS: For any breaks in the skin, ask: \"When was the last tetanus booster?\"        NA  11. OTHER SYMPTOMS: \"Do you have any other symptoms?\" (e.g., abdomen pain, contractions, leaking of fluid from vagina or concerned water broke, vaginal bleeding, )        Abdominal pain    Protocols used: Pregnancy " - Fall-A-[place]

## 2023-09-12 NOTE — TELEPHONE ENCOUNTER
Provider Recommendation Follow Up:   Reached patient/caregiver. Informed of provider's recommendations. Patient verbalized understanding and agrees with the plan.      Patient states she is able to make appointment today. Patient scheduled.     Maggie Greene RN  [facility]

## 2023-10-03 ENCOUNTER — PRENATAL OFFICE VISIT (OUTPATIENT)
Dept: FAMILY MEDICINE | Facility: CLINIC | Age: 23
End: 2023-10-03
Payer: OTHER GOVERNMENT

## 2023-10-03 VITALS
SYSTOLIC BLOOD PRESSURE: 92 MMHG | OXYGEN SATURATION: 98 % | HEART RATE: 86 BPM | DIASTOLIC BLOOD PRESSURE: 58 MMHG | TEMPERATURE: 98 F | WEIGHT: 153 LBS | BODY MASS INDEX: 30.9 KG/M2

## 2023-10-03 DIAGNOSIS — Z22.330 GBS CARRIER: ICD-10-CM

## 2023-10-03 DIAGNOSIS — O09.90 SUPERVISION OF HIGH RISK PREGNANCY, ANTEPARTUM: Primary | ICD-10-CM

## 2023-10-03 PROBLEM — E05.90 HYPERTHYROIDISM: Status: ACTIVE | Noted: 2023-07-06

## 2023-10-03 PROBLEM — Z87.51 HISTORY OF PRETERM DELIVERY: Status: ACTIVE | Noted: 2023-04-17

## 2023-10-03 PROBLEM — Z86.32 HISTORY OF GESTATIONAL DIABETES MELLITUS: Status: ACTIVE | Noted: 2023-07-06

## 2023-10-03 PROBLEM — Z87.59 HISTORY OF PRE-ECLAMPSIA: Status: ACTIVE | Noted: 2023-07-06

## 2023-10-03 PROCEDURE — 99207 PR PRENATAL VISIT: CPT | Performed by: FAMILY MEDICINE

## 2023-10-03 NOTE — PROGRESS NOTES
Doing well.  No concerns today.  Prenatal flowsheet information is reviewed.  Reportable signs and symptoms discussed.    She is doing relatively well. She does not have time today to do the one hour blood sugar testing. She would like to come back for this.      She does measure small today, will have her update growth ultrasound. She will follow up in two weeks and come back in to have her one hour blood sugar drawn. Declining flu and tetanus today

## 2023-10-03 NOTE — PATIENT INSTRUCTIONS
Weeks 34 to 36 of Your Pregnancy: Care Instructions  Your belly has grown quite large. It's almost time to give birth! Your baby's lungs are almost ready to breathe air. The skull bones are firm enough to protect your baby's head. But they're soft enough to move down through the birth canal.    You might be wondering what to expect during labor. Because each birth is different, there's no way to know exactly what childbirth will be like for you. Talk to your doctor or midwife about any concerns you have.   You'll probably have a test for group B streptococcus (GBS). GBS is bacteria that can live in the vagina and rectum. GBS can make your baby sick after birth. If you test positive, you'll get antibiotics during labor.     Choose what type of pain relief you would like during labor.  You can choose from a few types, including medicine and non-medicine options. You may want to use several types of pain relief.     Know how medicines can help with pain during labor.  Some medicines lower anxiety and help with some of the pain. Others make your lower body numb so that you will feel less pain.     Tell your doctor about your pain medicine choice.  Do this before you start labor or very early in your labor. You may be able to change your mind during labor.     Learn about the stages of labor.    The first stage includes the early (latent) and active phases of labor. Contractions start in early labor. During active labor, contractions get stronger, last longer, and happen more often. And the cervix opens more rapidly.  The second stage starts when you're ready to push. During this stage, your baby is born.  During the third stage, you'll usually have a few more contractions to push out the placenta.   Follow-up care is a key part of your treatment and safety. Be sure to make and go to all appointments, and call your doctor if you are having problems. It's also a good idea to know your test results and keep a list of the  "medicines you take.  Where can you learn more?  Go to https://www.[employer].net/patiented  Enter B912 in the search box to learn more about \"Weeks 34 to 36 of Your Pregnancy: Care Instructions.\"  Current as of: November 9, 2022               Content Version: 13.7    0594-1923 [employer].   Care instructions adapted under license by your healthcare professional. If you have questions about a medical condition or this instruction, always ask your healthcare professional. [employer] disclaims any warranty or liability for your use of this information.      "

## 2023-10-05 ENCOUNTER — LAB (OUTPATIENT)
Dept: LAB | Facility: CLINIC | Age: 23
End: 2023-10-05
Payer: OTHER GOVERNMENT

## 2023-10-05 DIAGNOSIS — O09.90 SUPERVISION OF HIGH RISK PREGNANCY, ANTEPARTUM: ICD-10-CM

## 2023-10-05 LAB
GLUCOSE 1H P 50 G GLC PO SERPL-MCNC: 185 MG/DL (ref 70–129)
T PALLIDUM AB SER QL: NONREACTIVE

## 2023-10-05 PROCEDURE — 86780 TREPONEMA PALLIDUM: CPT

## 2023-10-05 PROCEDURE — 36415 COLL VENOUS BLD VENIPUNCTURE: CPT

## 2023-10-05 PROCEDURE — 82950 GLUCOSE TEST: CPT

## 2023-10-09 ENCOUNTER — TELEPHONE (OUTPATIENT)
Dept: FAMILY MEDICINE | Facility: CLINIC | Age: 23
End: 2023-10-09

## 2023-10-09 NOTE — TELEPHONE ENCOUNTER
Patient Returning Call    Reason for call:  return call    Information relayed to patient:  yes, see below. Did not relay Syphillus results  Patient scheduled for 3hr GTT    Patient has additional questions:  No      Okay to leave a detailed message?:  at N/A

## 2023-10-09 NOTE — TELEPHONE ENCOUNTER
----- Message from Jasmin Jaime MD sent at 10/9/2023  9:29 AM CDT -----  Please call patient, she failed her one hour blood sugar, can we get a 3 hour scheduled asap. She needs to be fasting for this.     Her syphilis testing is normal/negative.     Thanks!

## 2023-10-12 ENCOUNTER — HOSPITAL ENCOUNTER (OUTPATIENT)
Dept: ULTRASOUND IMAGING | Facility: CLINIC | Age: 23
Discharge: HOME | End: 2023-10-12
Attending: FAMILY MEDICINE | Admitting: FAMILY MEDICINE

## 2023-10-12 DIAGNOSIS — O09.90 SUPERVISION OF HIGH RISK PREGNANCY, ANTEPARTUM: ICD-10-CM

## 2023-10-12 PROCEDURE — 76816 OB US FOLLOW-UP PER FETUS: CPT

## 2023-10-13 ENCOUNTER — LAB (OUTPATIENT)
Dept: LAB | Facility: CLINIC | Age: 23
End: 2023-10-13
Payer: OTHER GOVERNMENT

## 2023-10-13 ENCOUNTER — TELEPHONE (OUTPATIENT)
Dept: FAMILY MEDICINE | Facility: CLINIC | Age: 23
End: 2023-10-13

## 2023-10-13 DIAGNOSIS — O09.90 SUPERVISION OF HIGH RISK PREGNANCY, ANTEPARTUM: ICD-10-CM

## 2023-10-13 LAB
GLUCOSE P FAST SERPL-MCNC: 99 MG/DL (ref 60–94)
HGB BLD-MCNC: 9.8 G/DL (ref 11.7–15.7)

## 2023-10-13 PROCEDURE — 36415 COLL VENOUS BLD VENIPUNCTURE: CPT

## 2023-10-13 PROCEDURE — 82952 GTT-ADDED SAMPLES: CPT

## 2023-10-13 PROCEDURE — 82951 GLUCOSE TOLERANCE TEST (GTT): CPT

## 2023-10-13 NOTE — TELEPHONE ENCOUNTER
----- Message from Jasmin Jaime MD sent at 10/13/2023  9:37 AM CDT -----  Please let marco know that her ultrasound showed normal baby growth.

## 2023-10-13 NOTE — TELEPHONE ENCOUNTER
Left message to call back for: pt   Information to relay to patient: please see below message from Dr. Jaime

## 2023-10-14 LAB
GESTATIONAL GTT 1 HR POST DOSE: 229 MG/DL (ref 60–179)
GESTATIONAL GTT 2 HR POST DOSE: 135 MG/DL (ref 60–154)
GESTATIONAL GTT 3 HR POST DOSE: 138 MG/DL (ref 60–139)

## 2023-10-16 NOTE — TELEPHONE ENCOUNTER
Left message to call back for: patient  Information to relay to patient: see below.   Prenatal appt 10/17/23.- we can discuss at that time.

## 2023-10-17 ENCOUNTER — PRENATAL OFFICE VISIT (OUTPATIENT)
Dept: FAMILY MEDICINE | Facility: CLINIC | Age: 23
End: 2023-10-17
Payer: OTHER GOVERNMENT

## 2023-10-17 VITALS
WEIGHT: 156 LBS | HEART RATE: 81 BPM | TEMPERATURE: 98 F | DIASTOLIC BLOOD PRESSURE: 60 MMHG | OXYGEN SATURATION: 99 % | SYSTOLIC BLOOD PRESSURE: 98 MMHG | BODY MASS INDEX: 31.51 KG/M2

## 2023-10-17 DIAGNOSIS — O24.419 GESTATIONAL DIABETES MELLITUS (GDM) IN THIRD TRIMESTER, GESTATIONAL DIABETES METHOD OF CONTROL UNSPECIFIED: Primary | ICD-10-CM

## 2023-10-17 PROCEDURE — 99207 PR PRENATAL VISIT: CPT | Performed by: FAMILY MEDICINE

## 2023-10-17 RX ORDER — LANCETS
EACH MISCELLANEOUS
Qty: 100 EACH | Refills: 6 | Status: SHIPPED | OUTPATIENT
Start: 2023-10-17

## 2023-10-17 NOTE — PROGRESS NOTES
Cervix is unreachable.  Cephalic position confirmed by Leopold maneuvers.  Prenatal flowsheet information is reviewed.  Reportable signs and symptoms discussed.    She does note more pelvic pain in the last few days. She does note that she has had not had any contractions.     She has had some episodes off dizziness and lightheadedness at times. She does note that she has not been drinking as well as she probably could. She does have a history of GDM previously. She does have new GDM.     She does not have other concerns.     She will start checking her blood sugars as well as increase fluids. If her numbers are up will consider starting insulin. In the meantime, is technically diet controlled, can wait on BPP.

## 2023-10-17 NOTE — PATIENT INSTRUCTIONS
34 weeks: MD visit with cervical exam, UA and BP check, Ultrasound for size and BPP and NST   35 weeks: MD visit with cervical exam, UA and BP check  36 weeks: MD visit with cervical exam, UA and BP check, NST   37 weeks: MD visit with cervical exam, UA and BP check  38 weeks: MD visit with cervical exam, UA and BP check, Ultrasound for size with BPP and NST  39 weeks: MD visit with cervical exam, UA and BP check, NST  40 weeks: MD visit with cervical exam, UAand BP check, Ultrasound for size, Twice weekly NST,Or Deliver!  41 weeks: Discuss induction at 41 weeks.     Goal blood sugars <95 fasting  <140 one hour after eating  <120 two hours after eating

## 2023-10-27 ENCOUNTER — PRENATAL OFFICE VISIT (OUTPATIENT)
Dept: FAMILY MEDICINE | Facility: CLINIC | Age: 23
End: 2023-10-27
Payer: OTHER GOVERNMENT

## 2023-10-27 VITALS
SYSTOLIC BLOOD PRESSURE: 110 MMHG | RESPIRATION RATE: 15 BRPM | WEIGHT: 160 LBS | OXYGEN SATURATION: 98 % | BODY MASS INDEX: 32.32 KG/M2 | TEMPERATURE: 97.9 F | HEART RATE: 86 BPM | DIASTOLIC BLOOD PRESSURE: 68 MMHG

## 2023-10-27 DIAGNOSIS — O09.90 SUPERVISION OF HIGH RISK PREGNANCY, ANTEPARTUM: Primary | ICD-10-CM

## 2023-10-27 DIAGNOSIS — O24.419 GESTATIONAL DIABETES MELLITUS (GDM) IN THIRD TRIMESTER, GESTATIONAL DIABETES METHOD OF CONTROL UNSPECIFIED: ICD-10-CM

## 2023-10-27 PROCEDURE — 99207 PR PRENATAL VISIT: CPT | Performed by: FAMILY MEDICINE

## 2023-10-27 NOTE — PROGRESS NOTES
Doing well.  No concerns today.  Cervix is mid, soft, closed, 50%.  Cephalic position confirmed by Leopold maneuvers.  Prenatal flowsheet information is reviewed.  Discussed signs of labor and when to call or come in.  Reportable signs and symptoms discussed.    She is having some contractions. Baby is moving well. She does not have her meter yet, will get this today and come in with numbers next week. She does feel well though.

## 2023-10-31 ENCOUNTER — PRENATAL OFFICE VISIT (OUTPATIENT)
Dept: FAMILY MEDICINE | Facility: CLINIC | Age: 23
End: 2023-10-31

## 2023-10-31 VITALS
TEMPERATURE: 98 F | RESPIRATION RATE: 14 BRPM | SYSTOLIC BLOOD PRESSURE: 100 MMHG | DIASTOLIC BLOOD PRESSURE: 68 MMHG | WEIGHT: 164 LBS | BODY MASS INDEX: 33.12 KG/M2 | OXYGEN SATURATION: 99 % | HEART RATE: 96 BPM

## 2023-10-31 DIAGNOSIS — R82.71 GBS BACTERIURIA: ICD-10-CM

## 2023-10-31 DIAGNOSIS — O09.90 SUPERVISION OF HIGH RISK PREGNANCY, ANTEPARTUM: Primary | ICD-10-CM

## 2023-10-31 DIAGNOSIS — O24.419 GESTATIONAL DIABETES MELLITUS (GDM) IN THIRD TRIMESTER, GESTATIONAL DIABETES METHOD OF CONTROL UNSPECIFIED: ICD-10-CM

## 2023-10-31 LAB
ALBUMIN UR-MCNC: 30 MG/DL
APPEARANCE UR: CLEAR
BILIRUB UR QL STRIP: ABNORMAL
COLOR UR AUTO: YELLOW
GLUCOSE UR STRIP-MCNC: NEGATIVE MG/DL
HGB UR QL STRIP: NEGATIVE
KETONES UR STRIP-MCNC: 15 MG/DL
LEUKOCYTE ESTERASE UR QL STRIP: NEGATIVE
NITRATE UR QL: NEGATIVE
PH UR STRIP: 6 [PH] (ref 5–8)
SP GR UR STRIP: >=1.03 (ref 1–1.03)
UROBILINOGEN UR STRIP-ACNC: 1 E.U./DL

## 2023-10-31 PROCEDURE — 87088 URINE BACTERIA CULTURE: CPT | Performed by: FAMILY MEDICINE

## 2023-10-31 PROCEDURE — 99207 PR PRENATAL VISIT: CPT | Performed by: FAMILY MEDICINE

## 2023-10-31 PROCEDURE — 87086 URINE CULTURE/COLONY COUNT: CPT | Performed by: FAMILY MEDICINE

## 2023-10-31 PROCEDURE — 81003 URINALYSIS AUTO W/O SCOPE: CPT | Performed by: FAMILY MEDICINE

## 2023-10-31 RX ORDER — DOCUSATE SODIUM 100 MG/1
100 CAPSULE, LIQUID FILLED ORAL 2 TIMES DAILY PRN
Qty: 30 CAPSULE | Refills: 0 | Status: SHIPPED | OUTPATIENT
Start: 2023-10-31

## 2023-10-31 NOTE — PROGRESS NOTES
Doing well.  No concerns today.  Cervix is posterior, long, closed and firm.  Cephalic position confirmed by Leopold maneuvers.  Prenatal flowsheet information is reviewed.  Discussed signs of labor and when to call or come in.  Reportable signs and symptoms discussed.    She is having some pain in her lower abdomen. She does feel this all the time.     She has frequency of urination, is a bit constipated as well.     Denies blurry vision, ruq pain, headaches. Mild calf swelling.     She is not checking her blood sugars, does not have a meter. IOL set up on 9/16 due to her lack of documented control due to her lack of checking blood sugars.

## 2023-11-02 LAB
BACTERIA UR CULT: ABNORMAL
BACTERIA UR CULT: ABNORMAL
GROUP B STREPTOCOCCUS (EXTERNAL): POSITIVE

## 2023-11-02 RX ORDER — AMOXICILLIN 500 MG/1
500 CAPSULE ORAL 3 TIMES DAILY
Qty: 21 CAPSULE | Refills: 0 | Status: ON HOLD | OUTPATIENT
Start: 2023-11-02 | End: 2023-11-12

## 2023-11-06 ENCOUNTER — HOSPITAL ENCOUNTER (OUTPATIENT)
Facility: CLINIC | Age: 23
Discharge: HOME | End: 2023-11-06
Attending: FAMILY MEDICINE | Admitting: FAMILY MEDICINE
Payer: OTHER GOVERNMENT

## 2023-11-06 ENCOUNTER — TELEPHONE (OUTPATIENT)
Dept: FAMILY MEDICINE | Facility: CLINIC | Age: 23
End: 2023-11-06

## 2023-11-06 VITALS — SYSTOLIC BLOOD PRESSURE: 125 MMHG | RESPIRATION RATE: 16 BRPM | DIASTOLIC BLOOD PRESSURE: 79 MMHG | TEMPERATURE: 98.7 F

## 2023-11-06 PROCEDURE — 250N000013 HC RX MED GY IP 250 OP 250 PS 637

## 2023-11-06 RX ORDER — AMOXICILLIN 500 MG/1
500 CAPSULE ORAL EVERY 8 HOURS SCHEDULED
Status: DISCONTINUED | OUTPATIENT
Start: 2023-11-06 | End: 2023-11-06

## 2023-11-06 RX ORDER — AMOXICILLIN 500 MG/1
500 CAPSULE ORAL ONCE
Status: COMPLETED | OUTPATIENT
Start: 2023-11-06 | End: 2023-11-06

## 2023-11-06 RX ADMIN — AMOXICILLIN 500 MG: 500 CAPSULE ORAL at 13:30

## 2023-11-06 ASSESSMENT — ACTIVITIES OF DAILY LIVING (ADL): ADLS_ACUITY_SCORE: 18

## 2023-11-06 NOTE — PROGRESS NOTES
OB Triage Note        Assessment and Plan:     Felisa Miramontes is a 23 year old  at 38w4d presenting today for evaluation of back pain. Of note, patient was recently worked up for suspected UTI. She was prescribed amoxicillin but has not yet started this medication. Pregnancy otherwise complicated by GDM.  Patient Active Problem List   Diagnosis    Scar of face    Infected dog bite of face, initial encounter    Facial paresthesia    History of  delivery    History of gestational diabetes mellitus    History of pre-eclampsia    Hyperthyroidism       Give one dose of amoxicillin 500 mg here with plan to continue amoxicillin 500 mg TID once discharged from triage  Encourage PO intake  Possible that patient is in early labor but will discharge to home with return precautions  Anticipate planning IOL on 23    Patient discussed with attending physician, Dr. Jaime , who agrees with the plan.      NAM NICHOLSON MD PGY-1 2023  [facility]       Subjective:     Felisa Miramontes is a  23 year old female at 38w4d with a current prenatal history significant for GDM who presents to OB triage with back pain in the settling of recent suspected UTI.  Back pain began last night and has continued since.  It is worse when she is up moving around.  The pain radiates around her lower abdomen.  Intermittently feels like contractions.  Denies any fevers, chills, nausea, vomiting, change in urination, headache, chest pain. She reports irregular contractions and back pain starting last night. She denies fluid leakage. She denies bleeding per vagina. Fetal movement is normal.  Estimated Date of Delivery: 2023 Patient's last menstrual period was 2023 (exact date).    Felisa Miramontes is a patient of Dr. Jaime. Her prenatal course has been complicated by  gestational diabetes, A1 (diet controlled). Currently, patient is  "scheduled for IOL on 11/9/23.       Prenatal labs:   Lab Results   Component Value Date    AS Negative 04/14/2023    HEPBANG Nonreactive 04/14/2023    CHPCRT Negative 04/14/2023    GCPCRT Negative 04/14/2023    HGB 9.8 (L) 10/13/2023          Review of Systems:   CONSTITUTIONAL: no fatigue, no unexpected change in weight  SKIN: no worrisome rashes or lesions  EYES: no acute vision problems or changes  ENT: no ear problems, no mouth problems, no throat problems  RESP: no significant cough, no shortness of breath  CV: no chest pain, no palpitations, no new or worsening peripheral edema  GI: no nausea, no vomiting, no constipation, no diarrhea  : no frequency, no dysuria, no hematuria  NEURO: no weakness, no dizziness, no headaches  ENDOCRINE: no temperature intolerance, no skin/hair changes  PSYCHIATRIC: NEGATIVE for changes in mood or trouble with sleep         Physical Exam:   Vitals:   /79   LMP 02/09/2023 (Exact Date)   0 lbs 0 oz  Estimated body mass index is 33.12 kg/m  as calculated from the following:    Height as of 8/11/23: 1.499 m (4' 11\").    Weight as of 10/31/23: 74.4 kg (164 lb).    GEN: Awake, alert in no apparent distress   HEENT: grossly normal  RESPIRATORY: no increased work of breathing  CARDIOVASCULAR: RRR, no murmur  ABDOMEN: gravid  PELVIC:  no fluid noted, no blood noted.   Cervix: 0/50%/-3  EXT:  no edema or calf tenderness    NST interpretation:  Baseline rate 140 normal  Accelerations present  Decelerations not present  Interpretation: reactive    Labs today:  No results found for any visits on 11/06/23.    "

## 2023-11-06 NOTE — DISCHARGE INSTRUCTIONS
Discharge Instruction for Undelivered Patients      You were seen for: Labor Assessment  We Consulted: Dr Goldberg  You had (Test or Medicine):NST     Diet:   Drink 8 to 12 glasses of liquids (milk, juice, water) every day.     Activity:  Call your doctor or nurse midwife if your baby is moving less than usual.     Call your provider if you notice:  Swelling in your face or increased swelling in your hands or legs.  Headaches that are not relieved by Tylenol (acetaminophen).  Changes in your vision (blurring: seeing spots or stars.)  Nausea (sick to your stomach) and vomiting (throwing up).   Weight gain of 5 pounds or more per week.  Heartburn that doesn't go away.  Signs of bladder infection: pain when you urinate (use the toilet), need to go more often and more urgently.  The bag of kim (rupture of membranes) breaks, or you notice leaking in your underwear.  Bright red blood in your underwear.  Abdominal (lower belly) or stomach pain.  For first baby: Contractions (tightening) less than 5 minutes apart for one hour or more.  Second (plus) baby: Contractions (tightening) less than 10 minutes apart and getting stronger.  *If less than 34 weeks: Contractions (tightening) more than 6 times in one hour.  Increase or change in vaginal discharge (note the color and amount)  Other: Start antibotics    Follow-up:  As scheduled in the clinic        
No

## 2023-11-06 NOTE — TELEPHONE ENCOUNTER
Talked to patient. She will come in on Wednesday at 11:30. Pt states she is in a lot of pain and is asking if she should go in anywhere before the appointment on Wednesday. Talked to clinic RN and asked them to triage patient.    Carri Knutson CMA.

## 2023-11-06 NOTE — TELEPHONE ENCOUNTER
S-(situation): Spoke to patient and she stated that she started with pain last night.  Reports it as back pain and also lower abdominal pain when she walks.     B-(background): Scheduled for induction on Thursday at [facility].     A-(assessment): Patient was having difficulty talking during conversation.  Advised patient to go there as soon as she can.  She stated she would find someone to drive her there.    R-(recommendations): Called [facility] and they are available.  Called RN on the unit and relayed MRN and that she will be coming in.      Swapna Colon, IMANIN RN  [facility]

## 2023-11-06 NOTE — TELEPHONE ENCOUNTER
Please call patient, she does need an appointment this week, can she make 11:30 on Wednesday work?     I have her all set up for induction of labor on Thursday, the plan is to arrive at 0700. She should call around 0600 to make sure they can still take her: [phone]

## 2023-11-06 NOTE — PROGRESS NOTES
Data: Patient presented to [facility]: 2023 11:59 AM.  Reason for maternal/fetal assessment is pelvic pain and Back pain . Patient reports continuous back pain. Patient denies uterine contractions, leaking of vaginal fluid/rupture of membranes, vaginal bleeding, nausea, vomiting, headache, visual disturbances, epigastric or RUQ pain, significant edema. Patient reports fetal movement is normal. Patient is a 38w4d .  Prenatal record reviewed. Pregnancy has been complicated by UTI one week ago without treatment .    Vital signs wnl. Support person is not present.     Action: Verbal consent for EFM. Triage assessment completed.     Response: Patient verbalized agreement with plan. Will contact Dr Jaime with update and further orders.

## 2023-11-08 ENCOUNTER — PRENATAL OFFICE VISIT (OUTPATIENT)
Dept: FAMILY MEDICINE | Facility: CLINIC | Age: 23
End: 2023-11-08
Payer: OTHER GOVERNMENT

## 2023-11-08 VITALS
OXYGEN SATURATION: 99 % | HEART RATE: 80 BPM | TEMPERATURE: 98 F | RESPIRATION RATE: 15 BRPM | BODY MASS INDEX: 33.12 KG/M2 | SYSTOLIC BLOOD PRESSURE: 128 MMHG | WEIGHT: 164 LBS | DIASTOLIC BLOOD PRESSURE: 72 MMHG

## 2023-11-08 DIAGNOSIS — O09.90 SUPERVISION OF HIGH RISK PREGNANCY, ANTEPARTUM: Primary | ICD-10-CM

## 2023-11-08 DIAGNOSIS — O24.419 GESTATIONAL DIABETES MELLITUS (GDM) IN THIRD TRIMESTER, GESTATIONAL DIABETES METHOD OF CONTROL UNSPECIFIED: ICD-10-CM

## 2023-11-08 PROCEDURE — 99207 PR PRENATAL VISIT: CPT | Performed by: FAMILY MEDICINE

## 2023-11-08 NOTE — PROGRESS NOTES
Doing well.  No concerns today.  Cervix check next visit.  Prenatal flowsheet information is reviewed.  Discussed indications, risks, complications and failure rate of inductions.  Induction date set: cervical ripening on 11/9  Discussed signs of labor and when to call or come in.    She is still having some contractions.     She is generally doing ok. She does not have insurance at this time, has not been able to fill her prescriptions due to this as well as get her meter covered. She did find her old one and did check two sugars, one was 78 fasting and one was just over 100 after eating.

## 2023-11-08 NOTE — PATIENT INSTRUCTIONS
Plan on arrival tomorrow at 0700. Please call around 0600 to make sure they can still take you. [phone]

## 2023-11-09 ENCOUNTER — HOSPITAL ENCOUNTER (INPATIENT)
Facility: CLINIC | Age: 23
LOS: 2 days | Discharge: HOME HEALTH | End: 2023-11-12
Attending: FAMILY MEDICINE | Admitting: FAMILY MEDICINE

## 2023-11-09 LAB
GLUCOSE BLDC GLUCOMTR-MCNC: 127 MG/DL (ref 70–99)
GLUCOSE BLDC GLUCOMTR-MCNC: 137 MG/DL (ref 70–99)
GLUCOSE BLDC GLUCOMTR-MCNC: 138 MG/DL (ref 70–99)
HBA1C MFR BLD: 6.1 %

## 2023-11-09 PROCEDURE — 250N000011 HC RX IP 250 OP 636: Performed by: FAMILY MEDICINE

## 2023-11-09 PROCEDURE — 82962 GLUCOSE BLOOD TEST: CPT

## 2023-11-09 PROCEDURE — 83036 HEMOGLOBIN GLYCOSYLATED A1C: CPT | Performed by: FAMILY MEDICINE

## 2023-11-09 PROCEDURE — 36415 COLL VENOUS BLD VENIPUNCTURE: CPT | Performed by: FAMILY MEDICINE

## 2023-11-09 PROCEDURE — 250N000013 HC RX MED GY IP 250 OP 250 PS 637: Performed by: FAMILY MEDICINE

## 2023-11-09 PROCEDURE — 99221 1ST HOSP IP/OBS SF/LOW 40: CPT | Performed by: FAMILY MEDICINE

## 2023-11-09 PROCEDURE — 96372 THER/PROPH/DIAG INJ SC/IM: CPT | Performed by: FAMILY MEDICINE

## 2023-11-09 RX ORDER — NICOTINE POLACRILEX 4 MG
15-30 LOZENGE BUCCAL
Status: DISCONTINUED | OUTPATIENT
Start: 2023-11-09 | End: 2023-11-10 | Stop reason: HOSPADM

## 2023-11-09 RX ORDER — HYDROXYZINE HYDROCHLORIDE 25 MG/1
50 TABLET, FILM COATED ORAL
Status: DISCONTINUED | OUTPATIENT
Start: 2023-11-09 | End: 2023-11-10 | Stop reason: HOSPADM

## 2023-11-09 RX ORDER — MISOPROSTOL 100 UG/1
25 TABLET ORAL
Status: DISCONTINUED | OUTPATIENT
Start: 2023-11-09 | End: 2023-11-10 | Stop reason: HOSPADM

## 2023-11-09 RX ORDER — TERBUTALINE SULFATE 1 MG/ML
0.25 INJECTION, SOLUTION SUBCUTANEOUS
Status: DISCONTINUED | OUTPATIENT
Start: 2023-11-09 | End: 2023-11-10 | Stop reason: HOSPADM

## 2023-11-09 RX ORDER — MORPHINE SULFATE 10 MG/ML
10 INJECTION, SOLUTION INTRAMUSCULAR; INTRAVENOUS
Status: COMPLETED | OUTPATIENT
Start: 2023-11-09 | End: 2023-11-09

## 2023-11-09 RX ORDER — DEXTROSE MONOHYDRATE 25 G/50ML
25-50 INJECTION, SOLUTION INTRAVENOUS
Status: DISCONTINUED | OUTPATIENT
Start: 2023-11-09 | End: 2023-11-10 | Stop reason: HOSPADM

## 2023-11-09 RX ADMIN — MISOPROSTOL 25 MCG: 100 TABLET ORAL at 15:56

## 2023-11-09 RX ADMIN — MISOPROSTOL 25 MCG: 100 TABLET ORAL at 22:13

## 2023-11-09 RX ADMIN — MISOPROSTOL 25 MCG: 100 TABLET ORAL at 11:30

## 2023-11-09 RX ADMIN — MISOPROSTOL 25 MCG: 100 TABLET ORAL at 09:27

## 2023-11-09 RX ADMIN — MISOPROSTOL 25 MCG: 100 TABLET ORAL at 18:00

## 2023-11-09 RX ADMIN — MISOPROSTOL 25 MCG: 100 TABLET ORAL at 13:37

## 2023-11-09 RX ADMIN — MORPHINE SULFATE 10 MG: 10 INJECTION, SOLUTION INTRAMUSCULAR; INTRAVENOUS at 22:19

## 2023-11-09 RX ADMIN — MISOPROSTOL 25 MCG: 100 TABLET ORAL at 20:15

## 2023-11-09 RX ADMIN — HYDROXYZINE HYDROCHLORIDE 50 MG: 25 TABLET, FILM COATED ORAL at 21:03

## 2023-11-09 ASSESSMENT — ACTIVITIES OF DAILY LIVING (ADL)
ADLS_ACUITY_SCORE: 18

## 2023-11-09 NOTE — H&P
[facility]    History and Physical  Obstetrics and Gynecology     Date of Admission:  2023    Assessment & Plan   Felisa Rojo is a 23 year old female who presents with GDM here for IOL  ASSESSMENT:   IUP @ 39w0d for induction of labor.  Indication GDM A1 with unknown control.  NST reactive.  Category  I    PLAN:   Admit - see IP orders  cervical ripening with misoprostol  Theraputic sleep as needed tonight  Prophylactic antibiotic for + GBS status once in labor  Anticipate     Jasmin Jaime MD    History of Present Illness   Felisa Rojo is a 23 year old female  39w0d  Estimated Date of Delivery: 23 is calculated from Patient's last menstrual period was 2023 (exact date). [facility]  for [facility] of labor.  Indication GDM A1 with unknown control.    Patient has GDM, did have a history of this previously. She has not been checking her blood sugars as she found out this week that her insurance lapsed. She does feel well.     PRENATAL COURSE  Prenatal course was complicated by    Patient Active Problem List    Diagnosis Date Noted    History of gestational diabetes mellitus 2023     Priority: Medium    History of pre-eclampsia 2023     Priority: Medium    Hyperthyroidism 2023     Priority: Medium    Facial paresthesia 2023     Priority: Medium    History of  delivery 2023     Priority: Medium    Scar of face 2022     Priority: Medium    Infected dog bite of face, initial encounter 2022     Priority: Medium         Recent Labs   Lab Test 23  1337   AS Negative     Rhogam not indicated   Recent Labs   Lab Test 23  1337   HEPBANG Nonreactive   HIAGAB Nonreactive   RUQIGG Positive       Past Medical History    I have reviewed this patient's medical history and updated it with pertinent information if needed.   Past Medical History:   Diagnosis Date    Gestational diabetes  mellitus (GDM) in third trimester controlled on oral hypoglycemic drug 8/26/2019    Pre-eclampsia 9/12/2019       Past Surgical History   I have reviewed this patient's surgical history and updated it with pertinent information if needed.  Past Surgical History:   Procedure Laterality Date    APPENDECTOMY  2009       Prior to Admission Medications   Prior to Admission Medications   Prescriptions Last Dose Informant Patient Reported? Taking?   Prenatal Vit-Fe Fumarate-FA (PRENATAL MULTIVITAMIN W/IRON) 27-0.8 MG tablet   No No   Sig: Take 1 tablet by mouth daily   amoxicillin (AMOXIL) 500 MG capsule   No No   Sig: Take 1 capsule (500 mg) by mouth 3 times daily   aspirin (ASA) 81 MG chewable tablet   No No   Sig: Take 1 tablet (81 mg) by mouth daily   blood glucose (NO BRAND SPECIFIED) test strip   No No   Sig: Use to test blood sugar 4 times daily or as directed. To accompany: Blood Glucose Monitor Brands: per insurance.   blood glucose monitoring (NO BRAND SPECIFIED) meter device kit   No No   Sig: Use to test blood sugar 4 times daily or as directed. Preferred blood glucose meter OR supplies to accompany: Blood Glucose Monitor Brands: per insurance.   cephALEXin (KEFLEX) 500 MG capsule   No No   Sig: Take 1 capsule (500 mg) by mouth 4 times daily   Patient not taking: Reported on 11/8/2023   docusate sodium (COLACE) 100 MG capsule   No No   Sig: Take 1 capsule (100 mg) by mouth 2 times daily as needed for constipation   thin (NO BRAND SPECIFIED) lancets   No No   Sig: Use with lanceting device. To accompany: Blood Glucose Monitor Brands: per insurance.      Facility-Administered Medications: None     Allergies   No Known Allergies    Social History   I have reviewed this patient's social history and updated it with pertinent information if needed. Felisa Rojo  reports that she has never smoked. She has never been exposed to tobacco smoke. She has never used smokeless tobacco. She reports that she does not  currently use alcohol. She reports that she does not use drugs.    Family History   I have reviewed this patient's family history and updated it with pertinent information if needed.   Family History   Problem Relation Age of Onset    Kidney failure Mother     Cerebrovascular Disease Father        Immunization History   Immunizations are current except for flu, tetanus    Physical Exam   Temp: 98.2  F (36.8  C) Temp src: Oral BP: 113/72 Pulse: 78   Resp: 16   O2 Device: None (Room air)    Vital Signs with Ranges  Temp:  [98.2  F (36.8  C)] 98.2  F (36.8  C)  Pulse:  [78] 78  Resp:  [16] 16  BP: (113)/(72) 113/72    Abdomen: gravid, single vertex fetus, non-tender.   Cervical Exam: closed and 1/ 30/ Posterior/ soft/ -3     Fetal Heart Tones: 145 baseline, moderate variablility, + accels, no decels, and Category I  TOCO:   frequency q 2-4 minutes    Constitutional: healthy, alert, and active   Respiratory: No increased work of breathing, good air exchange, clear to auscultation bilaterally, no crackles or wheezing  Cardiovascular: Normal apical impulse, regular rate and rhythm, normal S1 and S2, no S3 or S4, and no murmur noted  Skin/Extremites: no bruising or bleeding and normal skin color, texture, turgor

## 2023-11-09 NOTE — PROGRESS NOTES
Patient admission completed, SVE per RN 0.5/50/-3. VSS, patient reports occasional contractions, denies LOF. Planning to start with PO cytotec per MD order. Patient agreeable to plan, all questions answered at this time. MD to put in orders.     Viola Seaman RN

## 2023-11-10 PROBLEM — Z34.90 ENCOUNTER FOR INDUCTION OF LABOR: Status: ACTIVE | Noted: 2023-11-10

## 2023-11-10 LAB
ABO/RH(D): NORMAL
ANTIBODY SCREEN: NEGATIVE
BASOPHILS # BLD AUTO: 0 10E3/UL (ref 0–0.2)
BASOPHILS NFR BLD AUTO: 0 %
EOSINOPHIL # BLD AUTO: 0.1 10E3/UL (ref 0–0.7)
EOSINOPHIL NFR BLD AUTO: 1 %
ERYTHROCYTE [DISTWIDTH] IN BLOOD BY AUTOMATED COUNT: 14.1 % (ref 10–15)
GLUCOSE BLDC GLUCOMTR-MCNC: 101 MG/DL (ref 70–99)
GLUCOSE BLDC GLUCOMTR-MCNC: 92 MG/DL (ref 70–99)
GLUCOSE BLDC GLUCOMTR-MCNC: 98 MG/DL (ref 70–99)
HCT VFR BLD AUTO: 30.5 % (ref 35–47)
HGB BLD-MCNC: 9.9 G/DL (ref 11.7–15.7)
IMM GRANULOCYTES # BLD: 0.1 10E3/UL
IMM GRANULOCYTES NFR BLD: 1 %
LYMPHOCYTES # BLD AUTO: 2.7 10E3/UL (ref 0.8–5.3)
LYMPHOCYTES NFR BLD AUTO: 27 %
MCH RBC QN AUTO: 26.5 PG (ref 26.5–33)
MCHC RBC AUTO-ENTMCNC: 32.5 G/DL (ref 31.5–36.5)
MCV RBC AUTO: 82 FL (ref 78–100)
MONOCYTES # BLD AUTO: 0.5 10E3/UL (ref 0–1.3)
MONOCYTES NFR BLD AUTO: 5 %
NEUTROPHILS # BLD AUTO: 6.7 10E3/UL (ref 1.6–8.3)
NEUTROPHILS NFR BLD AUTO: 66 %
NRBC # BLD AUTO: 0 10E3/UL
NRBC BLD AUTO-RTO: 0 /100
PLATELET # BLD AUTO: 312 10E3/UL (ref 150–450)
RBC # BLD AUTO: 3.73 10E6/UL (ref 3.8–5.2)
SPECIMEN EXPIRATION DATE: NORMAL
T PALLIDUM AB SER QL: NONREACTIVE
WBC # BLD AUTO: 10.1 10E3/UL (ref 4–11)

## 2023-11-10 PROCEDURE — 120N000001 HC R&B MED SURG/OB

## 2023-11-10 PROCEDURE — 722N000001 HC LABOR CARE VAGINAL DELIVERY SINGLE

## 2023-11-10 PROCEDURE — 36415 COLL VENOUS BLD VENIPUNCTURE: CPT | Performed by: FAMILY MEDICINE

## 2023-11-10 PROCEDURE — 59400 OBSTETRICAL CARE: CPT | Performed by: FAMILY MEDICINE

## 2023-11-10 PROCEDURE — 86901 BLOOD TYPING SEROLOGIC RH(D): CPT | Performed by: FAMILY MEDICINE

## 2023-11-10 PROCEDURE — 250N000009 HC RX 250: Performed by: FAMILY MEDICINE

## 2023-11-10 PROCEDURE — 86780 TREPONEMA PALLIDUM: CPT | Performed by: FAMILY MEDICINE

## 2023-11-10 PROCEDURE — 250N000013 HC RX MED GY IP 250 OP 250 PS 637: Performed by: FAMILY MEDICINE

## 2023-11-10 PROCEDURE — 258N000003 HC RX IP 258 OP 636: Performed by: FAMILY MEDICINE

## 2023-11-10 PROCEDURE — 85025 COMPLETE CBC W/AUTO DIFF WBC: CPT | Performed by: FAMILY MEDICINE

## 2023-11-10 PROCEDURE — 250N000011 HC RX IP 250 OP 636: Performed by: FAMILY MEDICINE

## 2023-11-10 RX ORDER — METOCLOPRAMIDE HYDROCHLORIDE 5 MG/ML
10 INJECTION INTRAMUSCULAR; INTRAVENOUS EVERY 6 HOURS PRN
Status: DISCONTINUED | OUTPATIENT
Start: 2023-11-10 | End: 2023-11-10 | Stop reason: HOSPADM

## 2023-11-10 RX ORDER — PENICILLIN G 3000000 [IU]/50ML
3 INJECTION, SOLUTION INTRAVENOUS EVERY 4 HOURS
Status: DISCONTINUED | OUTPATIENT
Start: 2023-11-10 | End: 2023-11-10 | Stop reason: HOSPADM

## 2023-11-10 RX ORDER — KETOROLAC TROMETHAMINE 30 MG/ML
30 INJECTION, SOLUTION INTRAMUSCULAR; INTRAVENOUS
Status: DISCONTINUED | OUTPATIENT
Start: 2023-11-10 | End: 2023-11-12 | Stop reason: HOSPADM

## 2023-11-10 RX ORDER — NALOXONE HYDROCHLORIDE 0.4 MG/ML
0.4 INJECTION, SOLUTION INTRAMUSCULAR; INTRAVENOUS; SUBCUTANEOUS
Status: DISCONTINUED | OUTPATIENT
Start: 2023-11-10 | End: 2023-11-10 | Stop reason: HOSPADM

## 2023-11-10 RX ORDER — IBUPROFEN 800 MG/1
800 TABLET, FILM COATED ORAL EVERY 6 HOURS PRN
Status: DISCONTINUED | OUTPATIENT
Start: 2023-11-10 | End: 2023-11-12 | Stop reason: HOSPADM

## 2023-11-10 RX ORDER — OXYTOCIN 10 [USP'U]/ML
10 INJECTION, SOLUTION INTRAMUSCULAR; INTRAVENOUS
Status: DISCONTINUED | OUTPATIENT
Start: 2023-11-10 | End: 2023-11-12 | Stop reason: HOSPADM

## 2023-11-10 RX ORDER — CARBOPROST TROMETHAMINE 250 UG/ML
250 INJECTION, SOLUTION INTRAMUSCULAR
Status: DISCONTINUED | OUTPATIENT
Start: 2023-11-10 | End: 2023-11-10 | Stop reason: HOSPADM

## 2023-11-10 RX ORDER — NALOXONE HYDROCHLORIDE 0.4 MG/ML
0.2 INJECTION, SOLUTION INTRAMUSCULAR; INTRAVENOUS; SUBCUTANEOUS
Status: DISCONTINUED | OUTPATIENT
Start: 2023-11-10 | End: 2023-11-10 | Stop reason: HOSPADM

## 2023-11-10 RX ORDER — METHYLERGONOVINE MALEATE 0.2 MG/ML
200 INJECTION INTRAVENOUS
Status: DISCONTINUED | OUTPATIENT
Start: 2023-11-10 | End: 2023-11-10 | Stop reason: HOSPADM

## 2023-11-10 RX ORDER — MODIFIED LANOLIN
OINTMENT (GRAM) TOPICAL
Status: DISCONTINUED | OUTPATIENT
Start: 2023-11-10 | End: 2023-11-12 | Stop reason: HOSPADM

## 2023-11-10 RX ORDER — BISACODYL 10 MG
10 SUPPOSITORY, RECTAL RECTAL DAILY PRN
Status: DISCONTINUED | OUTPATIENT
Start: 2023-11-10 | End: 2023-11-12 | Stop reason: HOSPADM

## 2023-11-10 RX ORDER — FENTANYL CITRATE 50 UG/ML
100 INJECTION, SOLUTION INTRAMUSCULAR; INTRAVENOUS
Status: DISCONTINUED | OUTPATIENT
Start: 2023-11-10 | End: 2023-11-10 | Stop reason: HOSPADM

## 2023-11-10 RX ORDER — DOCUSATE SODIUM 100 MG/1
100 CAPSULE, LIQUID FILLED ORAL DAILY
Status: DISCONTINUED | OUTPATIENT
Start: 2023-11-10 | End: 2023-11-12 | Stop reason: HOSPADM

## 2023-11-10 RX ORDER — OXYTOCIN 10 [USP'U]/ML
10 INJECTION, SOLUTION INTRAMUSCULAR; INTRAVENOUS
Status: DISCONTINUED | OUTPATIENT
Start: 2023-11-10 | End: 2023-11-10 | Stop reason: HOSPADM

## 2023-11-10 RX ORDER — OXYTOCIN/0.9 % SODIUM CHLORIDE 30/500 ML
100-340 PLASTIC BAG, INJECTION (ML) INTRAVENOUS CONTINUOUS PRN
Status: DISCONTINUED | OUTPATIENT
Start: 2023-11-10 | End: 2023-11-12 | Stop reason: HOSPADM

## 2023-11-10 RX ORDER — PROCHLORPERAZINE 25 MG
25 SUPPOSITORY, RECTAL RECTAL EVERY 12 HOURS PRN
Status: DISCONTINUED | OUTPATIENT
Start: 2023-11-10 | End: 2023-11-10 | Stop reason: HOSPADM

## 2023-11-10 RX ORDER — PROCHLORPERAZINE MALEATE 10 MG
10 TABLET ORAL EVERY 6 HOURS PRN
Status: DISCONTINUED | OUTPATIENT
Start: 2023-11-10 | End: 2023-11-10 | Stop reason: HOSPADM

## 2023-11-10 RX ORDER — MISOPROSTOL 200 UG/1
400 TABLET ORAL
Status: DISCONTINUED | OUTPATIENT
Start: 2023-11-10 | End: 2023-11-10 | Stop reason: HOSPADM

## 2023-11-10 RX ORDER — ONDANSETRON 2 MG/ML
4 INJECTION INTRAMUSCULAR; INTRAVENOUS EVERY 6 HOURS PRN
Status: DISCONTINUED | OUTPATIENT
Start: 2023-11-10 | End: 2023-11-10 | Stop reason: HOSPADM

## 2023-11-10 RX ORDER — MISOPROSTOL 200 UG/1
400 TABLET ORAL
Status: DISCONTINUED | OUTPATIENT
Start: 2023-11-10 | End: 2023-11-12 | Stop reason: HOSPADM

## 2023-11-10 RX ORDER — CITRIC ACID/SODIUM CITRATE 334-500MG
30 SOLUTION, ORAL ORAL
Status: DISCONTINUED | OUTPATIENT
Start: 2023-11-10 | End: 2023-11-10 | Stop reason: HOSPADM

## 2023-11-10 RX ORDER — MISOPROSTOL 200 UG/1
800 TABLET ORAL
Status: DISCONTINUED | OUTPATIENT
Start: 2023-11-10 | End: 2023-11-10 | Stop reason: HOSPADM

## 2023-11-10 RX ORDER — CARBOPROST TROMETHAMINE 250 UG/ML
250 INJECTION, SOLUTION INTRAMUSCULAR
Status: DISCONTINUED | OUTPATIENT
Start: 2023-11-10 | End: 2023-11-12 | Stop reason: HOSPADM

## 2023-11-10 RX ORDER — OXYTOCIN/0.9 % SODIUM CHLORIDE 30/500 ML
340 PLASTIC BAG, INJECTION (ML) INTRAVENOUS CONTINUOUS PRN
Status: DISCONTINUED | OUTPATIENT
Start: 2023-11-10 | End: 2023-11-12 | Stop reason: HOSPADM

## 2023-11-10 RX ORDER — DEXTROSE MONOHYDRATE 25 G/50ML
25-50 INJECTION, SOLUTION INTRAVENOUS
Status: DISCONTINUED | OUTPATIENT
Start: 2023-11-10 | End: 2023-11-10 | Stop reason: HOSPADM

## 2023-11-10 RX ORDER — SODIUM CHLORIDE, SODIUM LACTATE, POTASSIUM CHLORIDE, CALCIUM CHLORIDE 600; 310; 30; 20 MG/100ML; MG/100ML; MG/100ML; MG/100ML
INJECTION, SOLUTION INTRAVENOUS CONTINUOUS
Status: DISCONTINUED | OUTPATIENT
Start: 2023-11-10 | End: 2023-11-10 | Stop reason: HOSPADM

## 2023-11-10 RX ORDER — MISOPROSTOL 200 UG/1
800 TABLET ORAL
Status: DISCONTINUED | OUTPATIENT
Start: 2023-11-10 | End: 2023-11-12 | Stop reason: HOSPADM

## 2023-11-10 RX ORDER — NICOTINE POLACRILEX 4 MG
15-30 LOZENGE BUCCAL
Status: DISCONTINUED | OUTPATIENT
Start: 2023-11-10 | End: 2023-11-10 | Stop reason: HOSPADM

## 2023-11-10 RX ORDER — IBUPROFEN 800 MG/1
800 TABLET, FILM COATED ORAL
Status: DISCONTINUED | OUTPATIENT
Start: 2023-11-10 | End: 2023-11-12 | Stop reason: HOSPADM

## 2023-11-10 RX ORDER — OXYTOCIN/0.9 % SODIUM CHLORIDE 30/500 ML
340 PLASTIC BAG, INJECTION (ML) INTRAVENOUS CONTINUOUS PRN
Status: DISCONTINUED | OUTPATIENT
Start: 2023-11-10 | End: 2023-11-10 | Stop reason: HOSPADM

## 2023-11-10 RX ORDER — METOCLOPRAMIDE 10 MG/1
10 TABLET ORAL EVERY 6 HOURS PRN
Status: DISCONTINUED | OUTPATIENT
Start: 2023-11-10 | End: 2023-11-10 | Stop reason: HOSPADM

## 2023-11-10 RX ORDER — ONDANSETRON 4 MG/1
4 TABLET, ORALLY DISINTEGRATING ORAL EVERY 6 HOURS PRN
Status: DISCONTINUED | OUTPATIENT
Start: 2023-11-10 | End: 2023-11-10 | Stop reason: HOSPADM

## 2023-11-10 RX ORDER — ACETAMINOPHEN 325 MG/1
650 TABLET ORAL EVERY 4 HOURS PRN
Status: DISCONTINUED | OUTPATIENT
Start: 2023-11-10 | End: 2023-11-12 | Stop reason: HOSPADM

## 2023-11-10 RX ORDER — HYDROCORTISONE 25 MG/G
CREAM TOPICAL 3 TIMES DAILY PRN
Status: DISCONTINUED | OUTPATIENT
Start: 2023-11-10 | End: 2023-11-12 | Stop reason: HOSPADM

## 2023-11-10 RX ORDER — PENICILLIN G POTASSIUM 5000000 [IU]/1
5 INJECTION, POWDER, FOR SOLUTION INTRAMUSCULAR; INTRAVENOUS ONCE
Status: COMPLETED | OUTPATIENT
Start: 2023-11-10 | End: 2023-11-10

## 2023-11-10 RX ORDER — SODIUM CHLORIDE 9 MG/ML
INJECTION, SOLUTION INTRAVENOUS CONTINUOUS
Status: DISCONTINUED | OUTPATIENT
Start: 2023-11-10 | End: 2023-11-10 | Stop reason: HOSPADM

## 2023-11-10 RX ORDER — METHYLERGONOVINE MALEATE 0.2 MG/ML
200 INJECTION INTRAVENOUS
Status: DISCONTINUED | OUTPATIENT
Start: 2023-11-10 | End: 2023-11-12 | Stop reason: HOSPADM

## 2023-11-10 RX ADMIN — SODIUM CHLORIDE, POTASSIUM CHLORIDE, SODIUM LACTATE AND CALCIUM CHLORIDE: 600; 310; 30; 20 INJECTION, SOLUTION INTRAVENOUS at 03:00

## 2023-11-10 RX ADMIN — Medication 340 ML/HR: at 05:23

## 2023-11-10 RX ADMIN — Medication: at 23:49

## 2023-11-10 RX ADMIN — DOCUSATE SODIUM 100 MG: 100 CAPSULE, LIQUID FILLED ORAL at 10:27

## 2023-11-10 RX ADMIN — PENICILLIN G POTASSIUM 5 MILLION UNITS: 5000000 POWDER, FOR SOLUTION INTRAMUSCULAR; INTRAPLEURAL; INTRATHECAL; INTRAVENOUS at 03:04

## 2023-11-10 RX ADMIN — MISOPROSTOL 25 MCG: 100 TABLET ORAL at 00:10

## 2023-11-10 ASSESSMENT — ACTIVITIES OF DAILY LIVING (ADL)
ADLS_ACUITY_SCORE: 18

## 2023-11-10 NOTE — PROGRESS NOTES
RN at bedside for 15 minutes following Nitrous Oxide 50/50 inhalation initiation. Patient is observed using the equipment appropriately. Patient appears to be coping with labor. Patient is free of side effects.    Raquel Rod RN

## 2023-11-10 NOTE — PROGRESS NOTES
Pt requesting SVE because there was a small amount of blood in toilet. SVE unchanged. Educated pt on expectations for cervical change and sleep meds that are ordered. MISO #6 administered.

## 2023-11-10 NOTE — PROGRESS NOTES
Patient has received 5 doses of Cytotec, last dose given at 1800. Still able to talk through contractions, rates contraction discomfort 7/10. Patient still planning to labor without an epidural, is aware of pain management options if she changes her mind. Patient enjoyed laboring in the tub for a bit. Monitoring 1 hr post prandial blood sugars. VSS, denies LOF. Significant other supportive at bedside.     Viola Seaman RN

## 2023-11-10 NOTE — L&D DELIVERY NOTE
OB Vaginal Delivery Note    Felisa Rojo MRN# [id number]   Age: 23 year old YOB: 2000       GA: 39w1d  GP:   Labor Complications: None   EBL:   mL  Delivery QBL:    Delivery Type: Vaginal, Spontaneous   ROM to Delivery Time: (Delivered) Hours: 2 Minutes: 36   Weight: 2.78 kg (6 lb 2.1 oz)    1 Minute 5 Minute 10 Minute   Apgar Totals: 8   9        ANTONIA ROSEN;TESSA PETERS;DANG BURKETT     Delivery Details:  Felisa Rojo, a 23 year old  female delivered a viable infant with apgars of 8  and 9 . Patient was fully dilated and pushing after   hours   minutes in active labor. Delivery was via vaginal, spontaneous  to a sterile field under nitrous oxide  anesthesia. Infant delivered in vertex  left  occiput  anterior  position. She did have her hand at cheek level with delivery. Mom did deliver in a throne/squatting position.  Anterior and posterior shoulders delivered without difficulty. The cord was clamped, cut twice and 3 vessels  were noted. Cord blood was obtained in routine fashion with the following disposition: lab .      Cord complications: nuchal   Placenta delivered at 11/10/2023  5:23 AM . Placental disposition was Hospital disposal . Fundal massage performed and fundus found to be firm.     Episiotomy: none    Perineum, vagina, cervix were inspected, and the following lacerations were noted:   Perineal lacerations: none   periurethral laceration: bilateral         the smallest, shallowest low liliana-urethral or high labial lacerations, not repaired as they were demonstrating good hemostasis.       Excellent hemostasis was noted. Needle count correct. Infant and patient in delivery room in good and stable condition.        Lexie Rojo-Felisa [[id number]]      Labor Events    Labor Type: Induction/Cervical ripening     Antibiotics received during labor?: Yes  Reason for Antibiotics: GBS       Rupture date/time: 11/10/23 0240   Rupture type: Spontaneous Rupture  "of Membranes  Fluid color: Clear  Fluid odor: Normal     Induction: Misoprostol  Induction date/time:      Cervical ripening date/time:      Indications for induction: Diabetes (Comment to specify)     Augmentation: None       Delivery/Placenta Date and Time      Delivery Date: 11/10/23 Delivery Time:  5:16 AM   Placenta Date/Time: 11/10/2023  5:23 AM  Delivering clinician: Jasmin Jaime MD   Other personnel present at delivery:  Provider Role   Raquel Rod RN Registered Nurse   Kenyon Barillas RN Registered Nurse   Dang Estrada RN Registered Nurse             Vaginal Counts              Needles Suture Needles Sponges (RETIRED) Instruments   Initial counts       Added to count       Relief counts       Final counts               Placed during labor Accounted for at the end of labor   FSE NA NA   IUPC NA NA   Cervidil NA NA                            Pre-Birth Team Brief: Complete  Post-Birth Team Debrief: Complete       Apgars    Living status: Living   1 Minute 5 Minute 10 Minute 15 Minute 20 Minute   Skin color: 1  1       Heart rate: 2  2       Reflex irritability: 2  2       Muscle tone: 2  2       Respiratory effort: 1  2       Total: 8  9       Apgars assigned by: DANG ESTRADA RN       Cord      Vessels: 3 Vessels    Cord Complications: Nuchal   Nuchal Intervention: delivered through         Nuchal cord description: loose nuchal cord         Cord Blood Disposition: Lab    Gases Sent?: No    Delayed cord clamping?: Yes    Cord Clamping Delay (seconds):  seconds                       [place] Resuscitation    Methods: None               Measurements      Weight: 6 lb 2.1 oz Length: 1' 7.69\"     Head circumference: 32 cm           Skin to Skin and Feeding Plan      Skin to skin initiation date/time: 1841    Skin to skin with: Father  Skin to skin end date/time:            Labor Events and Shoulder Dystocia    Fetal Tracing Prior to Delivery: Category 1  Shoulder dystocia " present?: Neg                 Delivery (Maternal) (Provider to Complete) ([id number])    Episiotomy: None      Perineal lacerations: None      Periurethral laceration: bilateral Repaired?: No   Repair suture: None  Genital tract inspection done: Pos       Blood Loss  Mother: Felisa Rojo #[id number]     Start of Mother's Information      Delivery Blood Loss  11/09/23 1716 - 11/10/23 0534      None                 End of Mother's Information  Mother: Felisa Rojo #[id number]                Delivery - Provider to Complete ([id number])    Delivering clinician: Jasmin Jaime MD  Delivery Type (Choose the 1 that will go to the Birth History): Vaginal, Spontaneous                         Other personnel:  Provider Role   Raquel Rod RN Registered Nurse   Kenyon Barillas RN Registered Nurse   Reena Estrada RN Registered Nurse                    Placenta    Date/Time: 11/10/2023  5:23 AM  Removal: Spontaneous  Disposition: Hospital disposal             Anesthesia    Method: Nitrous Oxide                    Presentation and Position    Presentation: Vertex    Position: Left Occiput Anterior                     Jasmin Jaime MD

## 2023-11-11 LAB — GLUCOSE BLDC GLUCOMTR-MCNC: 91 MG/DL (ref 70–99)

## 2023-11-11 PROCEDURE — 99207 PR NO CHARGE LOS: CPT | Performed by: FAMILY MEDICINE

## 2023-11-11 PROCEDURE — 250N000013 HC RX MED GY IP 250 OP 250 PS 637: Performed by: FAMILY MEDICINE

## 2023-11-11 PROCEDURE — 120N000001 HC R&B MED SURG/OB

## 2023-11-11 RX ADMIN — ACETAMINOPHEN 650 MG: 325 TABLET ORAL at 06:15

## 2023-11-11 RX ADMIN — DOCUSATE SODIUM 100 MG: 100 CAPSULE, LIQUID FILLED ORAL at 09:20

## 2023-11-11 RX ADMIN — ACETAMINOPHEN 650 MG: 325 TABLET ORAL at 14:44

## 2023-11-11 RX ADMIN — ACETAMINOPHEN 650 MG: 325 TABLET ORAL at 20:48

## 2023-11-11 ASSESSMENT — ACTIVITIES OF DAILY LIVING (ADL)
ADLS_ACUITY_SCORE: 18

## 2023-11-11 NOTE — PLAN OF CARE
Goal Outcome Evaluation:      Problem: Postpartum (Vaginal Delivery)  Goal: Optimal Pain Control and Function  Outcome: Progressing     Problem: Postpartum (Vaginal Delivery)  Goal: Effective Urinary Elimination  Outcome: Progressing     Vitally stable. Bonding well with baby. Minimal bleeding. Declines pain medication.

## 2023-11-11 NOTE — PROGRESS NOTES
[facility]    Post-Partum Progress Note    Assessment & Plan   Assessment:  Post-partum day #1  Normal spontaneous vaginal delivery    Doing well.    Plan:  Ambulation encouraged  Breast feeding strategies discussed  Anticipate discharge tomorrow secondary to inadequately treated GBS    Jasmin Jaime MD     Interval History   Doing well.  Pain is adequately controlled.  No fevers.  No history of foul-smelling vaginal discharge.  Good appetite.  Denies chest pain, shortness of breath, nausea or vomiting.  Vaginal bleeding is similar to a heavy menstrual flow.  Breastfeeding well.    Patient had rapid progression of labor with SROM unfortunately so does have to stay for 48 hours as she only received one dose of antibiotics, <4 hrs prior to delivery.     Medications    - MEDICATION INSTRUCTIONS -      - MEDICATION INSTRUCTIONS -      oxytocin in 0.9% NaCl 100 mL/hr (11/10/23 0600)    oxytocin        docusate sodium  100 mg Oral Daily       Physical Exam   Temp: 98.1  F (36.7  C) Temp src: Oral BP: 107/60 Pulse: 78   Resp: 18        There were no vitals filed for this visit.  Vital Signs with Ranges  Temp:  [98.1  F (36.7  C)-98.7  F (37.1  C)] 98.1  F (36.7  C)  Pulse:  [] 78  Resp:  [16-18] 18  BP: (104-117)/(56-74) 107/60  I/O last 3 completed shifts:  In: -   Out: 700 [Urine:700]    Uterine fundus is firm, non-tender and at the level of the umbilicus    Data   Recent Labs   Lab Test 11/10/23  0305   AS Negative     Recent Labs   Lab Test 11/10/23  0305 10/13/23  0858   HGB 9.9* 9.8*     Recent Labs   Lab Test 04/14/23  1337   RUQIGG Positive

## 2023-11-11 NOTE — CONSULTS
ALEJANDRO spoke with bedside RN who states pt has no health insurance and would like assistance with obtaining it.  ALEJANDRO sent email to financial counselor requesting follow up.  Anticipate FC will follow up on Monday, 11/13/23.    JEOVANNY Mason  11/11/2023  12:18 PM

## 2023-11-12 VITALS
SYSTOLIC BLOOD PRESSURE: 107 MMHG | RESPIRATION RATE: 18 BRPM | OXYGEN SATURATION: 98 % | TEMPERATURE: 98.2 F | HEART RATE: 75 BPM | DIASTOLIC BLOOD PRESSURE: 60 MMHG

## 2023-11-12 PROCEDURE — 250N000013 HC RX MED GY IP 250 OP 250 PS 637: Performed by: FAMILY MEDICINE

## 2023-11-12 PROCEDURE — 99239 HOSP IP/OBS DSCHRG MGMT >30: CPT | Performed by: FAMILY MEDICINE

## 2023-11-12 RX ORDER — ACETAMINOPHEN 325 MG/1
650 TABLET ORAL EVERY 4 HOURS PRN
COMMUNITY
Start: 2023-11-12

## 2023-11-12 RX ORDER — IBUPROFEN 800 MG/1
800 TABLET, FILM COATED ORAL EVERY 6 HOURS PRN
Qty: 30 TABLET | Refills: 1 | Status: SHIPPED | OUTPATIENT
Start: 2023-11-12

## 2023-11-12 RX ADMIN — ACETAMINOPHEN 650 MG: 325 TABLET ORAL at 04:16

## 2023-11-12 RX ADMIN — DOCUSATE SODIUM 100 MG: 100 CAPSULE, LIQUID FILLED ORAL at 09:04

## 2023-11-12 ASSESSMENT — ACTIVITIES OF DAILY LIVING (ADL)
ADLS_ACUITY_SCORE: 18

## 2023-11-12 NOTE — PLAN OF CARE
Goal Outcome Evaluation:      Plan of Care Reviewed With: patient           Vital signs WNL. Patient doing well. Breastfeeding independently, supplementing with DBM.

## 2023-11-12 NOTE — PLAN OF CARE
"  Problem: Adult Inpatient Plan of Care  Goal: Plan of Care Review  Description: The Plan of Care Review/Shift note should be completed every shift.  The Outcome Evaluation is a brief statement about your assessment that the patient is improving, declining, or no change.  This information will be displayed automatically on your shift  note.  Outcome: Met  Goal: Patient-Specific Goal (Individualized)  Description: You can add care plan individualizations to a care plan. Examples of Individualization might be:  \"Parent requests to be called daily at 9am for status\", \"I have a hard time hearing out of my right ear\", or \"Do not touch me to wake me up as it startles  me\".  Outcome: Met  Goal: Absence of Hospital-Acquired Illness or Injury  Outcome: Met  Goal: Optimal Comfort and Wellbeing  Outcome: Met  Intervention: Provide Person-Centered Care  Recent Flowsheet Documentation  Taken 2023 by Gely Gallo RN  Trust Relationship/Rapport:   care explained   emotional support provided   choices provided  Goal: Readiness for Transition of Care  Outcome: Met     Problem: Postpartum (Vaginal Delivery)  Goal: Successful Parent Role Transition  Outcome: Met  Goal: Hemostasis  Outcome: Met  Goal: Absence of Infection Signs and Symptoms  Outcome: Met  Goal: Anesthesia/Sedation Recovery  Outcome: Met  Goal: Optimal Pain Control and Function  Outcome: Met  Goal: Effective Urinary Elimination  Outcome: Met   Goal Outcome Evaluation:               Pt VSS, fundus firm with scant bleeding. Pain controlled with ordered medications. Up independent and voiding well. Breastfeeding baby and attentive to all  cues. Discharge teaching complete and questions answered.         "

## 2023-11-12 NOTE — PLAN OF CARE
Problem: Adult Inpatient Plan of Care  Goal: Readiness for Transition of Care  Outcome: Progressing     Problem: Postpartum (Vaginal Delivery)  Goal: Successful Parent Role Transition  Outcome: Progressing   VSS. Breastfeeding and using DBM. Fundus firm without massage.

## 2023-11-12 NOTE — DISCHARGE SUMMARY
[facility] Discharge Summary    Felisa Rojo MRN# [id number]   Age: 23 year old YOB: 2000     Date of Admission:  11/9/2023  Date of Discharge::  11/12/2023  Admitting Physician:  Jasmin Jaime MD  Discharge Physician:  Jasmin Sifuentes MD     Home clinic: [facility]          Admission Diagnoses:   Encounter for induction of labor [Z34.90]          Discharge Diagnosis:     Normal spontaneous vaginal delivery  Intrauterine pregnancy at 39 weeks gestation          Procedures:     Procedure(s): No additional procedures performed                Medications Prior to Admission:     Medications Prior to Admission   Medication Sig Dispense Refill Last Dose    blood glucose (NO BRAND SPECIFIED) test strip Use to test blood sugar 4 times daily or as directed. To accompany: Blood Glucose Monitor Brands: per insurance. 100 strip 6     blood glucose monitoring (NO BRAND SPECIFIED) meter device kit Use to test blood sugar 4 times daily or as directed. Preferred blood glucose meter OR supplies to accompany: Blood Glucose Monitor Brands: per insurance. 1 kit 0     docusate sodium (COLACE) 100 MG capsule Take 1 capsule (100 mg) by mouth 2 times daily as needed for constipation 30 capsule 0     Prenatal Vit-Fe Fumarate-FA (PRENATAL MULTIVITAMIN W/IRON) 27-0.8 MG tablet Take 1 tablet by mouth daily 90 tablet 3     thin (NO BRAND SPECIFIED) lancets Use with lanceting device. To accompany: Blood Glucose Monitor Brands: per insurance. 100 each 6     [DISCONTINUED] amoxicillin (AMOXIL) 500 MG capsule Take 1 capsule (500 mg) by mouth 3 times daily 21 capsule 0     [DISCONTINUED] aspirin (ASA) 81 MG chewable tablet Take 1 tablet (81 mg) by mouth daily 90 tablet 3     [DISCONTINUED] cephALEXin (KEFLEX) 500 MG capsule Take 1 capsule (500 mg) by mouth 4 times daily (Patient not taking: Reported on 11/8/2023) 40 capsule 0              Discharge Medications:     Current Discharge Medication  List        START taking these medications    Details   acetaminophen (TYLENOL) 325 MG tablet Take 2 tablets (650 mg) by mouth every 4 hours as needed for mild pain or fever (greater than or equal to 38  C /100.4  F (oral) or 38.5  C/ 101.4  F (core).)    Associated Diagnoses:  (normal spontaneous vaginal delivery)      ibuprofen (ADVIL/MOTRIN) 800 MG tablet Take 1 tablet (800 mg) by mouth every 6 hours as needed for other (cramping)  Qty: 30 tablet, Refills: 1    Associated Diagnoses:  (normal spontaneous vaginal delivery)           CONTINUE these medications which have NOT CHANGED    Details   blood glucose (NO BRAND SPECIFIED) test strip Use to test blood sugar 4 times daily or as directed. To accompany: Blood Glucose Monitor Brands: per insurance.  Qty: 100 strip, Refills: 6    Associated Diagnoses: Gestational diabetes mellitus (GDM) in third trimester, gestational diabetes method of control unspecified      blood glucose monitoring (NO BRAND SPECIFIED) meter device kit Use to test blood sugar 4 times daily or as directed. Preferred blood glucose meter OR supplies to accompany: Blood Glucose Monitor Brands: per insurance.  Qty: 1 kit, Refills: 0    Associated Diagnoses: Gestational diabetes mellitus (GDM) in third trimester, gestational diabetes method of control unspecified      docusate sodium (COLACE) 100 MG capsule Take 1 capsule (100 mg) by mouth 2 times daily as needed for constipation  Qty: 30 capsule, Refills: 0    Associated Diagnoses: Supervision of high risk pregnancy, antepartum; Gestational diabetes mellitus (GDM) in third trimester, gestational diabetes method of control unspecified      Prenatal Vit-Fe Fumarate-FA (PRENATAL MULTIVITAMIN W/IRON) 27-0.8 MG tablet Take 1 tablet by mouth daily  Qty: 90 tablet, Refills: 3    Associated Diagnoses: Supervision of high risk pregnancy, antepartum      thin (NO BRAND SPECIFIED) lancets Use with lanceting device. To accompany: Blood Glucose Monitor  Brands: per insurance.  Qty: 100 each, Refills: 6    Associated Diagnoses: Gestational diabetes mellitus (GDM) in third trimester, gestational diabetes method of control unspecified           STOP taking these medications       amoxicillin (AMOXIL) 500 MG capsule Comments:   Reason for Stopping:         aspirin (ASA) 81 MG chewable tablet Comments:   Reason for Stopping:         cephALEXin (KEFLEX) 500 MG capsule Comments:   Reason for Stopping:                     Consultations:   No consultations were requested during this admission          Brief History of Labor:   Felisa Rojo, a 23 year old  female delivered a viable infant with apgars of 8  and 9 . She was induced for GDM A1 with unknown control. GBS positive (only 1 dose abx given)    Delivery was via vaginal, spontaneous  to a sterile field under nitrous oxide  anesthesia. Infant delivered in vertex  left  occiput  anterior  position. She did have her hand at cheek level with delivery. Mom did deliver in a throne/squatting position.  Anterior and posterior shoulders delivered without difficulty. The cord was clamped, cut twice and 3 vessels  were noted. Cord blood was obtained in routine fashion with the following disposition: lab .              Hospital Course:   The patient's hospital course was unremarkable.  On discharge, her pain was well controlled. Vaginal bleeding is similar to peak menstrual flow.  Voiding without difficulty.  Ambulating well and tolerating a normal diet.  No fever.  Breastfeeding well.  Infant is stable.  She was discharged on post-partum day #2.    Post-partum hemoglobin:   Hemoglobin   Date Value Ref Range Status   11/10/2023 9.9 (L) 11.7 - 15.7 g/dL Final             Discharge Instructions and Follow-Up:     Discharge diet: Regular   Discharge activity: Activity as tolerated   Discharge follow-up: Follow up with primary care provider in 6 weeks   Wound care: Drink plenty of fluids  Ice to area for comfort  Keep wound  clean and dry           Discharge Disposition:     Discharged to home      Attestation:  I have reviewed today's vital signs, notes, medications, labs and imaging.    Jasmin Sifuentes MD

## 2023-11-13 ENCOUNTER — PATIENT OUTREACH (OUTPATIENT)
Dept: CARE COORDINATION | Facility: CLINIC | Age: 23
End: 2023-11-13

## 2023-11-13 NOTE — PROGRESS NOTES
Clinic Care Coordination Contact  [facility]/Voicemail    Clinical Data: Care Coordinator Outreach    Outreach Documentation Number of Outreach Attempt   11/13/2023   1:13 PM 1       No answer at time of CHW call today, CHW unable to leave a message as voicemail box is currently full  Plan: Care Coordinator CHW to discuss recent CC referral and offer assistance  Care Coordinator will try to reach patient again in 1-2 business days.    Order Questions    Question Answer   Reason for Referral: Financial Support   Financial Support: Insurance   Clinical Staff have discussed the Care Coordination Referral with the patient and/or caregiver: Yes     Huyen ROCHE  Community Health Worker  [facility] Care Coordination  [email or web address]  [email or web address]  Office: [phone]

## 2023-11-14 ENCOUNTER — PATIENT OUTREACH (OUTPATIENT)
Dept: CARE COORDINATION | Facility: CLINIC | Age: 23
End: 2023-11-14

## 2023-11-14 NOTE — PROGRESS NOTES
Clinic Care Coordination Contact  [facility]    Clinical Data: Care Coordinator Outreach    Outreach Documentation Number of Outreach Attempt   11/13/2023   1:13 PM 1   11/14/2023  12:25 PM 2       Left message on  patient's spouse Eyad  mihir with call back information and requested return call. Patient's mailbox is currently full    Plan: Care Coordinator CHW to discuss recent CC referral  Care Coordinator will try to reach patient again in 1-2 business days       Order Questions     Question Answer   Reason for Referral: Financial Support   Financial Support: Insurance   Clinical Staff have discussed the Care Coordination Referral with the patient and/or caregiver: Yes     Huyen ROCHE  Community Health Worker  [facility] Care Coordination  [email or web address]  [email or web address]  Office: [phone]

## 2023-11-15 ENCOUNTER — PATIENT OUTREACH (OUTPATIENT)
Dept: CARE COORDINATION | Facility: CLINIC | Age: 23
End: 2023-11-15

## 2023-11-15 NOTE — PROGRESS NOTES
Clinic Care Coordination Contact  [facility]    Clinical Data: Care Coordinator Outreach    Outreach Documentation Number of Outreach Attempt   11/13/2023   1:13 PM 1   11/14/2023  12:25 PM 2   11/15/2023   1:04 PM 3       CHW received Team message regarding patient returning my call, CHW was unable to take patient call. CHW returned call today to patient no answer and mailbox is currently full.  CHW will attempt to reach patient in 2 days    Care Coordinator will try to reach patient again in 1-2 business days to discuss CC referral    Huyen ROCHE  Community Health Worker  [facility] [address] [facility] Care Coordination  [email or web address]  Office: [phone]

## 2023-11-16 ENCOUNTER — PATIENT OUTREACH (OUTPATIENT)
Dept: CARE COORDINATION | Facility: CLINIC | Age: 23
End: 2023-11-16

## 2023-11-16 NOTE — PROGRESS NOTES
Clinic Care Coordination Contact  Community Health Worker Initial Outreach    CHW Note:  CHW contacted patient regarding a referral that was placed for CCC. CHW introduced self and role of CCC.  Patient denied needing needing any additional support or resources at this time as she has already been contacted regarding her insurance concern. CHW informed patient of CCC introduction letter that would be sent via mail and encouraged her to reach out in the future if needs or concerns arise. Patient was grateful for the call and agreed to contact CCC in the future if needed.     Patient accepts CC: No, patient declined enrolling in CCC at this time. Patient will be sent Care Coordination introduction letter for future reference.     Order Questions    Question Answer   Reason for Referral: Financial Support   Financial Support: Insurance   Clinical Staff have discussed the Care Coordination Referral with the patient and/or caregiver: Yes         Aileen Johnson  Community Health Worker   [facility] Care Coordination  [facility]   [email or web address]  Office: [phone]

## 2023-11-16 NOTE — LETTER
[facility] CARE COORDINATION  [facility]   November 16, 2023    Felisa Rojo  [address]  [place] [address]      Dear Felisa,    I am a clinic care coordinator who works at the [facility]. I wanted to thank you for spending the time to talk with me.  Below is a description of clinic care coordination and how I can further assist you.       The clinic care coordination team is made up of a registered nurse, , financial resource worker and community health worker who understand the health care system. The goal of clinic care coordination is to help you manage your health and improve access to the health care system. Our team works alongside your provider to assist you in determining your health and social needs. We can help you obtain health care and community resources, providing you with necessary information and education. We can work with you through any barriers and develop a care plan that helps coordinate and strengthen the communication between you and your care team.  Our services are voluntary and are offered without charge to you personally.    Please feel free to contact me with any questions or concerns regarding care coordination and what we can offer.      We are focused on providing you with the highest-quality healthcare experience possible.    Sincerely,       Huyen ROCHE  Community Health Worker  [facility] Care Coordination  [facility]  [email or web address]  [email or web address]  Office: [phone]